# Patient Record
Sex: FEMALE | Race: ASIAN | NOT HISPANIC OR LATINO | Employment: UNEMPLOYED | URBAN - METROPOLITAN AREA
[De-identification: names, ages, dates, MRNs, and addresses within clinical notes are randomized per-mention and may not be internally consistent; named-entity substitution may affect disease eponyms.]

---

## 2020-11-10 ENCOUNTER — TELEPHONE ENCOUNTER (OUTPATIENT)
Dept: URBAN - METROPOLITAN AREA CLINIC 35 | Facility: CLINIC | Age: 75
End: 2020-11-10

## 2020-11-16 ENCOUNTER — OFFICE VISIT (OUTPATIENT)
Dept: URBAN - METROPOLITAN AREA CLINIC 33 | Facility: CLINIC | Age: 75
End: 2020-11-16

## 2020-11-16 VITALS
BODY MASS INDEX: 20.22 KG/M2 | WEIGHT: 103 LBS | SYSTOLIC BLOOD PRESSURE: 130 MMHG | HEIGHT: 60 IN | DIASTOLIC BLOOD PRESSURE: 70 MMHG

## 2020-11-16 RX ORDER — BUMETANIDE 2 MG/1
AS DIRECTED TABLET ORAL
Status: ACTIVE | COMMUNITY

## 2020-11-16 RX ORDER — AMIODARONE HYDROCHLORIDE 200 MG/1
1 TABLET TABLET ORAL ONCE A DAY
Qty: 30 | Status: ACTIVE | COMMUNITY

## 2020-11-16 RX ORDER — CYCLOSPORINE 0.5 MG/ML
1 DROP INTO AFFECTED EYE EMULSION OPHTHALMIC TWICE A DAY
Status: ACTIVE | COMMUNITY

## 2020-11-16 RX ORDER — THIAMINE HCL 100 MG
TABLET ORAL
Status: ACTIVE | COMMUNITY

## 2020-11-16 RX ORDER — DICLOFENAC SODIUM 10 MG/G
AS DIRECTED GEL TOPICAL
Status: ACTIVE | COMMUNITY

## 2020-11-16 RX ORDER — PILOCARPINE HYDROCHLORIDE 5 MG/1
1 TABLET TABLET, FILM COATED ORAL THREE TIMES A DAY
Qty: 90 | Status: ACTIVE | COMMUNITY

## 2020-11-16 RX ORDER — LEVOTHYROXINE SODIUM 25 UG/1
1 TABLET IN THE MORNING ON AN EMPTY STOMACH TABLET ORAL ONCE A DAY
Qty: 30 | Status: ACTIVE | COMMUNITY

## 2020-11-16 RX ORDER — NITROGLYCERIN 0.4 MG/1
AS DIRECTED TABLET SUBLINGUAL
Status: ACTIVE | COMMUNITY

## 2020-11-16 RX ORDER — POTASSIUM CHLORIDE 1500 MG/1
1 TABLET WITH FOOD TABLET, FILM COATED, EXTENDED RELEASE ORAL ONCE A DAY
Qty: 30 | Status: ACTIVE | COMMUNITY

## 2020-11-16 NOTE — HPI-MIGRATED HPI
;   ;     Bloating : 76 y/o female patient admits recent onset of bloating. Patient admits that her bloating issues started to occur after her open heart surgery. Patient admits associated symptoms such abdominal pain, indigestion. Patient admits abdominal tightness, early satiety, and shortness of breath. Patient states that taking Nexium OTC alleviates some symptoms.  Patient admits 1  formed bowel movements per day.  Patient denies mucus, melena, or blood in stools.   Patient is having bloating throughout the day. Patient states sometimes urgency with bowel movements and other times more bloating with bowel movements. It is a gaseous distention. There is no improvement with defecation.    Patient admits having a colonoscopy/EGD in 2016 with normal findings per patient. Patient admits family hx of colonic disease/cancer/polyps. ;   Elevated Liver Enzymes : Patient presents today for consultation about recent elevated liver enzymes.  Patient denies a history of elevated liver enzymes.  Patient admits cold extremeties, upper abdominal pains, since her open heart surgery in (02/2020).  Patient currently denies  jaundice,  dizziness, easy bruising, itchiness, or fatigue.  Recent labs were done on (11/03/2020), which shows: AST H 205 ALT H 194 BUN H 29  RISK FACTORS:  Denies  Alcohol, drugs, travel outside the [place], NSAIDs, protein supplements, tattoos, piercings,  service, blood transfusion, family history of liver disease or cancer, personal exposure to liver diseases, halfway, rehab    ;

## 2020-12-04 LAB
ACTIN (SMOOTH MUSCLE) ANTIBODY: 25
ANA DIRECT: POSITIVE
ANTI-DNA (DS) AB QN: <1
COMMENT:: (no result)
HBSAG SCREEN: NEGATIVE
HCV AB: 0.1
HEPATITIS B SURF AB QUANT: 63.4
LIVER-KIDNEY MICROSOMAL AB: 1.1
Lab: (no result)
MITOCHONDRIAL (M2) ANTIBODY: <20
RNP ANTIBODIES: 0.2
SJOGREN'S ANTI-SS-A: >8
SJOGREN'S ANTI-SS-B: <0.2
SMITH ANTIBODIES: <0.2

## 2021-01-05 ENCOUNTER — WEB ENCOUNTER (OUTPATIENT)
Dept: URBAN - METROPOLITAN AREA CLINIC 35 | Facility: CLINIC | Age: 76
End: 2021-01-05

## 2021-01-11 ENCOUNTER — WEB ENCOUNTER (OUTPATIENT)
Dept: URBAN - METROPOLITAN AREA CLINIC 35 | Facility: CLINIC | Age: 76
End: 2021-01-11

## 2021-01-29 ENCOUNTER — WEB ENCOUNTER (OUTPATIENT)
Dept: URBAN - METROPOLITAN AREA CLINIC 35 | Facility: CLINIC | Age: 76
End: 2021-01-29

## 2021-03-16 ENCOUNTER — TELEPHONE ENCOUNTER (OUTPATIENT)
Dept: URBAN - METROPOLITAN AREA CLINIC 35 | Facility: CLINIC | Age: 76
End: 2021-03-16

## 2021-03-23 ENCOUNTER — TELEPHONE ENCOUNTER (OUTPATIENT)
Dept: URBAN - METROPOLITAN AREA CLINIC 35 | Facility: CLINIC | Age: 76
End: 2021-03-23

## 2021-03-25 ENCOUNTER — OFFICE VISIT (OUTPATIENT)
Dept: URBAN - METROPOLITAN AREA CLINIC 33 | Facility: CLINIC | Age: 76
End: 2021-03-25

## 2021-03-25 ENCOUNTER — LAB OUTSIDE AN ENCOUNTER (OUTPATIENT)
Dept: URBAN - METROPOLITAN AREA SURGERY CENTER 8 | Facility: SURGERY CENTER | Age: 76
End: 2021-03-25

## 2021-03-25 ENCOUNTER — TELEPHONE ENCOUNTER (OUTPATIENT)
Dept: URBAN - METROPOLITAN AREA CLINIC 35 | Facility: CLINIC | Age: 76
End: 2021-03-25

## 2021-03-25 VITALS
HEART RATE: 62 BPM | SYSTOLIC BLOOD PRESSURE: 142 MMHG | BODY MASS INDEX: 21.6 KG/M2 | HEIGHT: 60 IN | DIASTOLIC BLOOD PRESSURE: 59 MMHG | OXYGEN SATURATION: 99 % | WEIGHT: 110 LBS

## 2021-03-25 RX ORDER — LEVOTHYROXINE SODIUM 25 UG/1
1 TABLET IN THE MORNING ON AN EMPTY STOMACH TABLET ORAL ONCE A DAY
Qty: 30 | Status: ACTIVE | COMMUNITY

## 2021-03-25 RX ORDER — AMIODARONE HYDROCHLORIDE 200 MG/1
1 TABLET TABLET ORAL ONCE A DAY
Qty: 30 | Status: DISCONTINUED | COMMUNITY

## 2021-03-25 RX ORDER — DICLOFENAC SODIUM 10 MG/G
AS DIRECTED GEL TOPICAL
Status: ACTIVE | COMMUNITY

## 2021-03-25 RX ORDER — NITROGLYCERIN 0.4 MG/1
AS DIRECTED TABLET SUBLINGUAL
Status: ACTIVE | COMMUNITY

## 2021-03-25 RX ORDER — THIAMINE HCL 100 MG
TABLET ORAL
Status: ACTIVE | COMMUNITY

## 2021-03-25 RX ORDER — CYCLOSPORINE 0.5 MG/ML
1 DROP INTO AFFECTED EYE EMULSION OPHTHALMIC TWICE A DAY
Status: ACTIVE | COMMUNITY

## 2021-03-25 RX ORDER — SPIRONOLACTONE 25 MG/1
1 TABLET TABLET, FILM COATED ORAL ONCE A DAY
Qty: 30 | Refills: 0 | OUTPATIENT
Start: 2021-03-25

## 2021-03-25 RX ORDER — POTASSIUM CHLORIDE 1500 MG/1
1 TABLET WITH FOOD TABLET, FILM COATED, EXTENDED RELEASE ORAL ONCE A DAY
Qty: 30 | Status: ACTIVE | COMMUNITY

## 2021-03-25 RX ORDER — BUMETANIDE 2 MG/1
AS DIRECTED TABLET ORAL
Status: ACTIVE | COMMUNITY

## 2021-03-25 RX ORDER — PILOCARPINE HYDROCHLORIDE 5 MG/1
1 TABLET TABLET, FILM COATED ORAL THREE TIMES A DAY
Qty: 90 | Status: ACTIVE | COMMUNITY

## 2021-03-25 NOTE — HPI-MIGRATED HPI
;   ;   ;   ;     Early Satiety : She admits associated early satiety, onset 02/2020. She reports eating 3 small meals per day with one snack at bedtime.  ;   Bloating : 75 year old female who presents today for a consultation of abdominal distention that "feels full of fluid". She notes onset of symptoms occurred (02/2020) after her  surgery, and reports daily episodes. She notes belching is an alleviating factor. She denies any aggravating factors.  She notes associated shortness of breath.   Of note: Patient reports only sleeping 3 hours per night due to discomfort in abdomen for the past 2 months.   Patient had right carotid endarterectomy with bovine pericardial patch angioplasty with intraoperative shunt placement. (01/29/2020)  Last visit (11/16/2020)  74 y/o female patient admits recent onset of bloating. Patient admits that her bloating issues started to occur after her open heart surgery. Patient admits associated symptoms such abdominal pain, indigestion. Patient admits abdominal tightness, early satiety, and shortness of breath. Patient states that taking Nexium OTC alleviates some symptoms.  Patient admits 1  formed bowel movements per day.  Patient denies mucus, melena, or blood in stools.   Patient is having bloating throughout the day. Patient states sometimes urgency with bowel movements and other times more bloating with bowel movements. It is a gaseous distention. There is no improvement with defecation.   Patient admits having a colonoscopy/EGD in 2016 with normal findings per patient. Patient admits family hx of colonic disease/cancer/polyps.;   Abdominal Pain : Patient reports associated abdominal pain, onset 2 months ago throughout her entire abdomen with symptoms most present at nighttime. She notes abdominal pain is alleviated with Nexium. On a scale of 1-10 she rates pain at 5. When she lays down, the pain will radiate to her left and right side of the abdomen. She notes belching is an alleviating factor. She notes symptoms worsen post prandial. She admits shortness of breath when eating. ;   Elevated Liver Enzymes : Most recent labs completed with PCP on (2/26/2021). Patient currently denies  jaundice,  dizziness, easy bruising, or itchiness.  She admits daily fatigue.    Last visit (11/16/2020) Patient presents today for consultation about recent elevated liver enzymes.  Patient denies a history of elevated liver enzymes.  Patient admits cold extremeties, upper abdominal pains, since her open heart surgery in (02/2020).  Patient currently denies  jaundice,  dizziness, easy bruising, itchiness, or fatigue.  Recent labs were done on (11/03/2020), which shows: AST H 205 ALT H 194 BUN H 29  RISK FACTORS:  Denies  Alcohol, drugs, travel outside the [place], NSAIDs, protein supplements, tattoos, piercings,  service, blood transfusion, family history of liver disease or cancer, personal exposure to liver diseases, halfway, rehab;

## 2021-03-30 ENCOUNTER — TELEPHONE ENCOUNTER (OUTPATIENT)
Dept: URBAN - METROPOLITAN AREA CLINIC 35 | Facility: CLINIC | Age: 76
End: 2021-03-30

## 2021-04-29 ENCOUNTER — TELEPHONE ENCOUNTER (OUTPATIENT)
Dept: URBAN - METROPOLITAN AREA CLINIC 35 | Facility: CLINIC | Age: 76
End: 2021-04-29

## 2021-04-29 ENCOUNTER — OFFICE VISIT (OUTPATIENT)
Dept: URBAN - METROPOLITAN AREA CLINIC 33 | Facility: CLINIC | Age: 76
End: 2021-04-29

## 2021-04-29 VITALS
SYSTOLIC BLOOD PRESSURE: 128 MMHG | HEART RATE: 60 BPM | DIASTOLIC BLOOD PRESSURE: 58 MMHG | HEIGHT: 60 IN | WEIGHT: 111 LBS | OXYGEN SATURATION: 98 % | BODY MASS INDEX: 21.79 KG/M2

## 2021-04-29 PROBLEM — 707724006 ELEVATED LIVER ENZYMES LEVEL: Status: ACTIVE | Noted: 2021-03-25

## 2021-04-29 PROBLEM — 102614006: Status: ACTIVE | Noted: 2021-03-25

## 2021-04-29 PROBLEM — 271737000: Status: ACTIVE | Noted: 2021-04-29

## 2021-04-29 PROBLEM — 60728008 BLOATING: Status: ACTIVE | Noted: 2021-03-25

## 2021-04-29 RX ORDER — PILOCARPINE HYDROCHLORIDE 5 MG/1
1 TABLET TABLET, FILM COATED ORAL THREE TIMES A DAY
Qty: 90 | Status: ACTIVE | COMMUNITY

## 2021-04-29 RX ORDER — LEVOTHYROXINE SODIUM 25 UG/1
1 TABLET IN THE MORNING ON AN EMPTY STOMACH TABLET ORAL ONCE A DAY
Qty: 30 | Status: ACTIVE | COMMUNITY

## 2021-04-29 RX ORDER — POTASSIUM CHLORIDE 1500 MG/1
1 TABLET WITH FOOD TABLET, FILM COATED, EXTENDED RELEASE ORAL ONCE A DAY
Qty: 30 | Status: ACTIVE | COMMUNITY

## 2021-04-29 RX ORDER — THIAMINE HCL 100 MG
TABLET ORAL
Status: ACTIVE | COMMUNITY

## 2021-04-29 RX ORDER — NITROGLYCERIN 0.4 MG/1
AS DIRECTED TABLET SUBLINGUAL
Status: ACTIVE | COMMUNITY

## 2021-04-29 RX ORDER — BUMETANIDE 2 MG/1
AS DIRECTED TABLET ORAL
Status: DISCONTINUED | COMMUNITY

## 2021-04-29 RX ORDER — FUROSEMIDE 20 MG/1
1 TABLET TABLET ORAL ONCE A DAY
Qty: 30 | OUTPATIENT
Start: 2021-04-29

## 2021-04-29 RX ORDER — CYCLOSPORINE 0.5 MG/ML
1 DROP INTO AFFECTED EYE EMULSION OPHTHALMIC TWICE A DAY
Status: ACTIVE | COMMUNITY

## 2021-04-29 RX ORDER — SPIRONOLACTONE 50 MG/1
1 TABLET TABLET, FILM COATED ORAL ONCE A DAY
Qty: 30 | OUTPATIENT
Start: 2021-04-29

## 2021-04-29 RX ORDER — BUMETANIDE 2 MG/1
AS DIRECTED TABLET ORAL
Status: ACTIVE | COMMUNITY

## 2021-04-29 RX ORDER — SPIRONOLACTONE 25 MG/1
1 TABLET TABLET, FILM COATED ORAL ONCE A DAY
Qty: 30 | Refills: 0 | Status: ACTIVE | COMMUNITY
Start: 2021-03-25

## 2021-04-29 RX ORDER — SILDENAFIL CITRATE 25 MG/1
1 TABLET AS NEEDED TABLET, FILM COATED ORAL ONCE A DAY
Qty: 30 | Status: ACTIVE | COMMUNITY

## 2021-04-29 RX ORDER — DICLOFENAC SODIUM 10 MG/G
AS DIRECTED GEL TOPICAL
Status: ACTIVE | COMMUNITY

## 2021-04-29 RX ORDER — POTASSIUM CHLORIDE 1500 MG/1
1 TABLET WITH FOOD TABLET, FILM COATED, EXTENDED RELEASE ORAL ONCE A DAY
OUTPATIENT

## 2021-04-29 NOTE — HPI-MIGRATED HPI
;   ;   ;   ;     Early Satiety : Patient admits/denies any episodes of early satiety since her last office visit.  Last visit (03/25/2021) She admits associated early satiety, onset 02/2020. She reports eating 3 small meals per day with one snack at bedtime.;   Ascites : Patient presents today for follow up of Ascites and to discuss her ultrasound guided paracentesis. She admits/denies any abdominal distension, abdominal pain, shortness of breath, or unintentional weight gain.   She admits/denies starting Spironolactone Tablet, 25 MG 1 tab qd with/or without relief of symptoms.    Last visit (03/25/2021) 75 year old female who presents today for a consultation of abdominal distention that "feels full of fluid". She notes onset of symptoms occurred (02/2020) after her  surgery, and reports daily episodes. She notes belching is an alleviating factor. She denies any aggravating factors.  She notes associated shortness of breath.   Of note: Patient reports only sleeping 3 hours per night due to discomfort in abdomen for the past 2 months.   Patient had right carotid endarterectomy with bovine pericardial patch angioplasty with intraoperative shunt placement. (01/29/2020)  Last visit (11/16/2020)  76 y/o female patient admits recent onset of bloating. Patient admits that her bloating issues started to occur after her open heart surgery. Patient admits associated symptoms such abdominal pain, indigestion. Patient admits abdominal tightness, early satiety, and shortness of breath. Patient states that taking Nexium OTC alleviates some symptoms.  Patient admits 1  formed bowel movements per day.  Patient denies mucus, melena, or blood in stools.   Patient is having bloating throughout the day. Patient states sometimes urgency with bowel movements and other times more bloating with bowel movements. It is a gaseous distention. There is no improvement with defecation.   Patient admits having a colonoscopy/EGD in 2016 with normal findings per patient. Patient admits family hx of colonic disease/cancer/polyps.;   Abdominal Pain : Patient admits/denies any sypmtoms of abdominal pain since her last office visit.  Last visit (03/25/202) Patient reports associated abdominal pain, onset 2 months ago throughout her entire abdomen with symptoms most present at nighttime. She notes abdominal pain is alleviated with Nexium. On a scale of 1-10 she rates pain at 5. When she lays down, the pain will radiate to her left and right side of the abdomen. She notes belching is an alleviating factor. She notes symptoms worsen post prandial. She admits shortness of breath when eating.;   Elevated Liver Enzymes : Most recent labs completed with PCP on (2/26/2021). Patient currently denies  jaundice,  dizziness, easy bruising, or itchiness.  She admits daily fatigue.    Last visit (11/16/2020) Patient presents today for consultation about recent elevated liver enzymes.  Patient denies a history of elevated liver enzymes.  Patient admits cold extremeties, upper abdominal pains, since her open heart surgery in (02/2020).  Patient currently denies  jaundice,  dizziness, easy bruising, itchiness, or fatigue.  Recent labs were done on (11/03/2020), which shows: AST H 205 ALT H 194 BUN H 29  RISK FACTORS:  Denies  Alcohol, drugs, travel outside the [place], NSAIDs, protein supplements, tattoos, piercings,  service, blood transfusion, family history of liver disease or cancer, personal exposure to liver diseases, [facility], rehab;

## 2021-04-29 NOTE — HPI-MIGRATED HPI
;   ;   ;   ;     Early Satiety : Admits continued daily episodes of early satiety since her last office visit. She reports eating 2 meals per day with snacks in between meals.    Last visit (03/25/2021) She admits associated early satiety, onset 02/2020. She reports eating 3 small meals per day with one snack at bedtime.;   Bloating : 75 year old female who presents today with her daughter Jany for a follow up of abdominal destention.  Daughter Jany states patient saw Cardiologist Dr. Flanagan yesterday and he suggest she keep appointment with GI for possbile repeat paracentesis.    Of note, daughter state she didn't realize labs were ordered and the MRI was r/s due to patient having a pace maker and could not have MRI at location she was initially schedued at.  MRI is set for May 11, 2021  She notes continued bloating in epigastric region, and shortness of breath. She states her left and right side of abdomen improved, however her epigastric region remained very hard and bloated.   Dr. Flanagan increased the dosage or Spironolactone to 50 mg QD and started her on Viagra 25 mg 1 QD.    Last visit (3/25/2021)  75 year old female who presents today for a consultation of abdominal distention that "feels full of fluid". She notes onset of symptoms occurred (02/2020) after her  surgery, and reports daily episodes. She notes belching is an alleviating factor. She denies any aggravating factors.  She notes associated shortness of breath.   Of note: Patient reports only sleeping 3 hours per night due to discomfort in abdomen for the past 2 months.   Patient had right carotid endarterectomy with bovine pericardial patch angioplasty with intraoperative shunt placement. (01/29/2020)  Last visit (11/16/2020)  74 y/o female patient admits recent onset of bloating. Patient admits that her bloating issues started to occur after her open heart surgery. Patient admits associated symptoms such abdominal pain, indigestion. Patient admits abdominal tightness, early satiety, and shortness of breath. Patient states that taking Nexium OTC alleviates some symptoms.  Patient admits 1  formed bowel movements per day.  Patient denies mucus, melena, or blood in stools.   Patient is having bloating throughout the day. Patient states sometimes urgency with bowel movements and other times more bloating with bowel movements. It is a gaseous distention. There is no improvement with defecation.   Patient admits having a colonoscopy/EGD in 2016 with normal findings per patient. Patient admits family hx of colonic disease/cancer/polyps.;   Abdominal Pain : Admits continued daily abdominal pain in the epigastric region. She denies any improvement since her last office visit.   Last visit (03/25/2021) Patient reports associated abdominal pain, onset 2 months ago throughout her entire abdomen with symptoms most present at nighttime. She notes abdominal pain is alleviated with Nexium. On a scale of 1-10 she rates pain at 5. When she lays down, the pain will radiate to her left and right side of the abdomen. She notes belching is an alleviating factor. She notes symptoms worsen post prandial. She admits shortness of breath when eating.;   Elevated Liver Enzymes : Denies jaundice,  dizziness, easy bruising. She admits daily fatigue, itchiness.   Last visit (03/25/2021) Most recent labs completed with PCP on (2/26/2021). Patient currently denies  jaundice,  dizziness, easy bruising, or itchiness.  She admits daily fatigue.    Last visit (11/16/2020)  Patient presents today for consultation about recent elevated liver enzymes.  Patient denies a history of elevated liver enzymes.  Patient admits cold extremeties, upper abdominal pains, since her open heart surgery in (02/2020).  Patient currently denies  jaundice,  dizziness, easy bruising, itchiness, or fatigue.  Recent labs were done on (11/03/2020), which shows: AST H 205 ALT H 194 BUN H 29  RISK FACTORS:  Denies  Alcohol, drugs, travel outside the [place], NSAIDs, protein supplements, tattoos, piercings,  service, blood transfusion, family history of liver disease or cancer, personal exposure to liver diseases, [facility]DC, rehab;

## 2021-04-29 NOTE — EXAM-MIGRATED EXAMINATIONS
ABDOMEN: - bowel sounds present, no masses palpable, no organomegaly , no rebound tenderness, soft, nontender, distended. Free fluid present ;   EXTREMITIES: - Right knee - medial aspect swelling ;

## 2021-05-13 ENCOUNTER — TELEPHONE ENCOUNTER (OUTPATIENT)
Dept: URBAN - METROPOLITAN AREA CLINIC 35 | Facility: CLINIC | Age: 76
End: 2021-05-13

## 2021-05-13 RX ORDER — SPIRONOLACTONE 25 MG/1
1 TABLET TABLET, FILM COATED ORAL ONCE A DAY
Qty: 30 | Refills: 0 | OUTPATIENT
Start: 2021-03-25

## 2021-05-26 ENCOUNTER — TELEPHONE ENCOUNTER (OUTPATIENT)
Dept: URBAN - METROPOLITAN AREA CLINIC 35 | Facility: CLINIC | Age: 76
End: 2021-05-26

## 2021-06-10 ENCOUNTER — OFFICE VISIT (OUTPATIENT)
Dept: URBAN - METROPOLITAN AREA CLINIC 33 | Facility: CLINIC | Age: 76
End: 2021-06-10

## 2021-06-10 VITALS
WEIGHT: 90 LBS | DIASTOLIC BLOOD PRESSURE: 50 MMHG | OXYGEN SATURATION: 96 % | SYSTOLIC BLOOD PRESSURE: 120 MMHG | HEIGHT: 60 IN | HEART RATE: 60 BPM | BODY MASS INDEX: 17.67 KG/M2

## 2021-06-10 RX ORDER — SPIRONOLACTONE 50 MG/1
1 TABLET TABLET, FILM COATED ORAL ONCE A DAY
Qty: 30 | Status: ACTIVE | COMMUNITY
Start: 2021-04-29

## 2021-06-10 RX ORDER — BUMETANIDE 2 MG/1
AS DIRECTED TABLET ORAL
Status: ACTIVE | COMMUNITY

## 2021-06-10 RX ORDER — THIAMINE HCL 100 MG
TABLET ORAL
Status: ON HOLD | COMMUNITY

## 2021-06-10 RX ORDER — SPIRONOLACTONE 25 MG/1
1 TABLET TABLET, FILM COATED ORAL ONCE A DAY
OUTPATIENT
Start: 2021-03-25

## 2021-06-10 RX ORDER — CYCLOSPORINE 0.5 MG/ML
1 DROP INTO AFFECTED EYE EMULSION OPHTHALMIC TWICE A DAY
Status: ACTIVE | COMMUNITY

## 2021-06-10 RX ORDER — PILOCARPINE HYDROCHLORIDE 5 MG/1
1 TABLET TABLET, FILM COATED ORAL THREE TIMES A DAY
Qty: 90 | Status: ON HOLD | COMMUNITY

## 2021-06-10 RX ORDER — POTASSIUM CHLORIDE 1500 MG/1
1 TABLET WITH FOOD TABLET, FILM COATED, EXTENDED RELEASE ORAL ONCE A DAY
Qty: 30 | Status: ON HOLD | COMMUNITY

## 2021-06-10 RX ORDER — SILDENAFIL CITRATE 25 MG/1
1 TABLET AS NEEDED TABLET, FILM COATED ORAL ONCE A DAY
Qty: 30 | Status: ACTIVE | COMMUNITY

## 2021-06-10 RX ORDER — DICLOFENAC SODIUM 10 MG/G
AS DIRECTED GEL TOPICAL
Status: ON HOLD | COMMUNITY

## 2021-06-10 RX ORDER — LEVOTHYROXINE SODIUM 25 UG/1
1 TABLET IN THE MORNING ON AN EMPTY STOMACH TABLET ORAL ONCE A DAY
Qty: 30 | Status: ACTIVE | COMMUNITY

## 2021-06-10 RX ORDER — NITROGLYCERIN 0.4 MG/1
AS DIRECTED TABLET SUBLINGUAL
Status: ACTIVE | COMMUNITY

## 2021-06-10 RX ORDER — FUROSEMIDE 20 MG/1
1 TABLET TABLET ORAL ONCE A DAY
Qty: 30 | Status: ON HOLD | COMMUNITY
Start: 2021-04-29

## 2021-06-10 RX ORDER — SPIRONOLACTONE 25 MG/1
1 TABLET TABLET, FILM COATED ORAL ONCE A DAY
Qty: 30 | Refills: 0 | Status: ACTIVE | COMMUNITY
Start: 2021-03-25

## 2021-06-10 RX ORDER — BUMETANIDE 1 MG/1
1 TABLET TABLET ORAL DAILY
Qty: 90 TABLET | Refills: 1 | OUTPATIENT

## 2021-06-10 NOTE — HPI-MIGRATED HPI
;   ;   ;   ;   ;   ;     Early Satiety : Patient admits to episodes of early satiety since her last office visit.  Last visit (03/25/2021) She admits associated early satiety, onset 02/2020. She reports eating 3 small meals per day with one snack at bedtime.;   Bloating : Patient denies continued bloating.     Last visit (4/29/2021)  75 year old female who presents today with her daughter Jany for a follow up of abdominal destention. Daughter Jany states patient saw Cardiologist Dr. Flanagan yesterday and he suggest she keep appointment with GI for possbile repeat paracentesis.          Of note, daughter state she didn't realize labs were ordered and the MRI was r/s due to patient having a pace maker and could not have MRI at location she was initially schedued at. MRI is set for May 11, 2021         She notes continued bloating in epigastric region, and shortness of breath. She states her left and right side of abdomen improved, however her epigastric region remained very hard and bloated.          Dr. Flanagan increased the dosage or Spironolactone to 50 mg QD and started her on Viagra 25 mg 1 QD.    Last visit (03/25/2021) 75 year old female who presents today for a consultation of abdominal distention that "feels full of fluid". She notes onset of symptoms occurred (02/2020) after her  surgery, and reports daily episodes. She notes belching is an alleviating factor. She denies any aggravating factors.  She notes associated shortness of breath.   Of note: Patient reports only sleeping 3 hours per night due to discomfort in abdomen for the past 2 months.   Patient had right carotid endarterectomy with bovine pericardial patch angioplasty with intraoperative shunt placement. (01/29/2020)  Last visit (11/16/2020)  76 y/o female patient admits recent onset of bloating. Patient admits that her bloating issues started to occur after her open heart surgery. Patient admits associated symptoms such abdominal pain, indigestion. Patient admits abdominal tightness, early satiety, and shortness of breath. Patient states that taking Nexium OTC alleviates some symptoms.  Patient admits 1  formed bowel movements per day.  Patient denies mucus, melena, or blood in stools.   Patient is having bloating throughout the day. Patient states sometimes urgency with bowel movements and other times more bloating with bowel movements. It is a gaseous distention. There is no improvement with defecation.   Patient admits having a colonoscopy/EGD in 2016 with normal findings per patient. Patient admits family hx of colonic disease/cancer/polyps.;   Ascites : 75 year- old female patient presents today for follow up of Ascites and to discuss her ultrasound guided paracentesis and lab results . She denies any abdominal distension, abdominal pain, she admits to shortness of breath, or unintentional weight gain.  She admits starting Spironolactone Tablet, 25 MG, Bumetanide 1 MG tablet. with relief of symptoms.  Patient is no longer taking Furosemide Tablet, 20 MG   Admits to the discontinuation of the Potassium Chloride ER Tablet Extended Release, 20 MEQ.  Labs drawn (06/01/2021) CBC Immunoglobulin A, Qn, Serum  736 High RBC 2.92 Low Hemoglobin  9.6 Low Hematocrit  28.7 Low MCV  98 High  CMP Glucose 117 High  BUN  100 Alert  Creatinine  1.97 High eGFR If NonAfricn Am 24 Low eGFR If Africn Am 28 Low  BUN/Creatinine Ratio 51 High  Sodium 132 Low Potassium 5.3 High Chloride 93 Low Protein, Total  9.9 High Globulin, Total 6.1 High A/G Ratio 0.6 Low AST  96 High  ALT  58 High  Iron and TIBC UIBC  374 High Iron Saturation 12 Low  Immunoglobulin G, Qn, Serum 01 4457 High  Vitamin B12  1863 High  Reticulocyte Count 2.9 High  MRI ABD (05/11/2021) 1. Limited study due to respiratory motion artifact. 2.  Hepatic cirrhosis. No define suspicious hepatic lesions within limitations of the study. Recommend continued surveillance. 3 . Moderate abdominal ascites. 4. Cholelithiasis. 5. Large right and moderate left pleural effusion. 6. Mild to moderate anasarca.   US paracenthesis (05/03/2021) 2.4 L of magui fluid drained without complications.    ;   Abdominal Pain : Patient denies any sypmtoms of abdominal pain since her last office visit.   Last visit (03/25/202) Patient reports associated abdominal pain, onset 2 months ago throughout her entire abdomen with symptoms most present at nighttime. She notes abdominal pain is alleviated with Nexium. On a scale of 1-10 she rates pain at 5. When she lays down, the pain will radiate to her left and right side of the abdomen. She notes belching is an alleviating factor. She notes symptoms worsen post prandial. She admits shortness of breath when eating.;   Constipation : Patient admits longstanding history of constipation.  Onset was 03/2020.  Patient currently admits 1 bowel movement per day.  Stools are small and hard.  Patient denies blood, mucus, or melena in stools.  Patient states that their previous bowel habits were 1 movement per a day, with soft and normal stools.  Patient denies aggravating factors and has tried Milk of magnesium nad pune juice with some relief of symptoms. Patient admits that she is no longer taking the Milk magnesium and Prune juice. Patient denies associated abdominal pains, bloating, and gas.  When a patient has a bowel movement she does not feel like she is completely emptying their bowels.  Patient denies fecal incontinence.  Patient denies recently drinking lake or well water. She admits to some recent changes in her medications with Synthroid 25 MCG. She denies taking any antibiotics in the last 6 months.  Patient admits to having a colonoscopy.  It was performed at [facility] in [place] in 2015 and it was NL.  ;   Elevated Liver Enzymes : Patient currently denies  jaundice,  dizziness, easy bruising.  She admits to some daily fatigue and itchiness    Last visit (4/29/2021) Most recent labs completed with PCP on (2/26/2021). Patient currently denies  jaundice,  dizziness, easy bruising, or itchiness.  She admits daily fatigue.    Last visit (11/16/2020) Patient presents today for consultation about recent elevated liver enzymes.  Patient denies a history of elevated liver enzymes.  Patient admits cold extremeties, upper abdominal pains, since her open heart surgery in (02/2020).  Patient currently denies  jaundice,  dizziness, easy bruising, itchiness, or fatigue.  Recent labs were done on (11/03/2020), which shows: AST H 205 ALT H 194 BUN H 29  RISK FACTORS:  Denies  Alcohol, drugs, travel outside the [place], NSAIDs, protein supplements, tattoos, piercings,  service, blood transfusion, family history of liver disease or cancer, personal exposure to liver diseases, correction, rehab;

## 2021-06-17 ENCOUNTER — TELEPHONE ENCOUNTER (OUTPATIENT)
Dept: URBAN - METROPOLITAN AREA CLINIC 35 | Facility: CLINIC | Age: 76
End: 2021-06-17

## 2021-06-17 ENCOUNTER — LAB OUTSIDE AN ENCOUNTER (OUTPATIENT)
Dept: URBAN - METROPOLITAN AREA SURGERY CENTER 8 | Facility: SURGERY CENTER | Age: 76
End: 2021-06-17

## 2021-06-21 ENCOUNTER — TELEPHONE ENCOUNTER (OUTPATIENT)
Dept: URBAN - METROPOLITAN AREA CLINIC 35 | Facility: CLINIC | Age: 76
End: 2021-06-21

## 2021-06-23 ENCOUNTER — TELEPHONE ENCOUNTER (OUTPATIENT)
Dept: URBAN - METROPOLITAN AREA CLINIC 35 | Facility: CLINIC | Age: 76
End: 2021-06-23

## 2021-07-01 ENCOUNTER — TELEPHONE ENCOUNTER (OUTPATIENT)
Dept: URBAN - METROPOLITAN AREA CLINIC 35 | Facility: CLINIC | Age: 76
End: 2021-07-01

## 2021-07-08 ENCOUNTER — OFFICE VISIT (OUTPATIENT)
Dept: URBAN - METROPOLITAN AREA CLINIC 33 | Facility: CLINIC | Age: 76
End: 2021-07-08

## 2021-07-09 ENCOUNTER — TELEPHONE ENCOUNTER (OUTPATIENT)
Dept: URBAN - METROPOLITAN AREA CLINIC 35 | Facility: CLINIC | Age: 76
End: 2021-07-09

## 2021-07-14 ENCOUNTER — TELEPHONE ENCOUNTER (OUTPATIENT)
Dept: URBAN - METROPOLITAN AREA CLINIC 35 | Facility: CLINIC | Age: 76
End: 2021-07-14

## 2021-07-16 ENCOUNTER — TELEPHONE ENCOUNTER (OUTPATIENT)
Dept: URBAN - METROPOLITAN AREA CLINIC 35 | Facility: CLINIC | Age: 76
End: 2021-07-16

## 2021-07-22 ENCOUNTER — OFFICE VISIT (OUTPATIENT)
Dept: URBAN - METROPOLITAN AREA CLINIC 35 | Facility: CLINIC | Age: 76
End: 2021-07-22

## 2021-07-22 RX ORDER — FUROSEMIDE 20 MG/1
1 TABLET TABLET ORAL ONCE A DAY
Qty: 30 | Status: ON HOLD | COMMUNITY
Start: 2021-04-29

## 2021-07-22 RX ORDER — THIAMINE HCL 100 MG
TABLET ORAL
Status: ON HOLD | COMMUNITY

## 2021-07-22 RX ORDER — DICLOFENAC SODIUM 10 MG/G
AS DIRECTED GEL TOPICAL
Status: ON HOLD | COMMUNITY

## 2021-07-22 RX ORDER — LEVOTHYROXINE SODIUM 25 UG/1
1 TABLET IN THE MORNING ON AN EMPTY STOMACH TABLET ORAL ONCE A DAY
Qty: 30 | Status: ACTIVE | COMMUNITY

## 2021-07-22 RX ORDER — SPIRONOLACTONE 25 MG/1
1 TABLET TABLET, FILM COATED ORAL ONCE A DAY
Status: ACTIVE | COMMUNITY
Start: 2021-03-25

## 2021-07-22 RX ORDER — CYCLOSPORINE 0.5 MG/ML
1 DROP INTO AFFECTED EYE EMULSION OPHTHALMIC TWICE A DAY
Status: ACTIVE | COMMUNITY

## 2021-07-22 RX ORDER — SPIRONOLACTONE 50 MG/1
1 TABLET TABLET, FILM COATED ORAL ONCE A DAY
Qty: 30 | Status: ACTIVE | COMMUNITY
Start: 2021-04-29

## 2021-07-22 RX ORDER — BUMETANIDE 1 MG/1
1 TABLET TABLET ORAL DAILY
Qty: 90 TABLET | Refills: 1 | Status: ACTIVE | COMMUNITY

## 2021-07-22 RX ORDER — NITROGLYCERIN 0.4 MG/1
AS DIRECTED TABLET SUBLINGUAL
Status: ACTIVE | COMMUNITY

## 2021-07-22 RX ORDER — PILOCARPINE HYDROCHLORIDE 5 MG/1
1 TABLET TABLET, FILM COATED ORAL THREE TIMES A DAY
Qty: 90 | Status: ON HOLD | COMMUNITY

## 2021-07-22 RX ORDER — POTASSIUM CHLORIDE 1500 MG/1
1 TABLET WITH FOOD TABLET, FILM COATED, EXTENDED RELEASE ORAL ONCE A DAY
Qty: 30 | Status: ON HOLD | COMMUNITY

## 2021-07-22 RX ORDER — SILDENAFIL CITRATE 25 MG/1
1 TABLET AS NEEDED TABLET, FILM COATED ORAL ONCE A DAY
Qty: 30 | Status: ACTIVE | COMMUNITY

## 2021-07-22 NOTE — HPI-MIGRATED HPI
;   ;   ;   ;   ;   ;     Early Satiety : She admits/denies a Nutritional consult since her last visit.   Patient admits/denies continued early satiety.   Last visit (6/10/2021)  Patient admits to episodes of early satiety since her last office visit.  Last visit (03/25/2021) She admits associated early satiety, onset 02/2020. She reports eating 3 small meals per day with one snack at bedtime.;   Bloating : Patient admits/denies improvement in episodes of bloating.    Last visit (6/10/2021)  Patient denies continued bloating.     Last visit (4/29/2021)  75 year old female who presents today with her daughter Jany for a follow up of abdominal destention. Daughter Jany states patient saw Cardiologist Dr. Flanagan yesterday and he suggest she keep appointment with GI for possbile repeat paracentesis.          Of note, daughter state she didn't realize labs were ordered and the MRI was r/s due to patient having a pace maker and could not have MRI at location she was initially schedued at. MRI is set for May 11, 2021         She notes continued bloating in epigastric region, and shortness of breath. She states her left and right side of abdomen improved, however her epigastric region remained very hard and bloated.          Dr. Flanagan increased the dosage or Spironolactone to 50 mg QD and started her on Viagra 25 mg 1 QD.    Last visit (03/25/2021) 75 year old female who presents today for a consultation of abdominal distention that "feels full of fluid". She notes onset of symptoms occurred (02/2020) after her  surgery, and reports daily episodes. She notes belching is an alleviating factor. She denies any aggravating factors.  She notes associated shortness of breath.   Of note: Patient reports only sleeping 3 hours per night due to discomfort in abdomen for the past 2 months.   Patient had right carotid endarterectomy with bovine pericardial patch angioplasty with intraoperative shunt placement. (01/29/2020)  Last visit (11/16/2020)  76 y/o female patient admits recent onset of bloating. Patient admits that her bloating issues started to occur after her open heart surgery. Patient admits associated symptoms such abdominal pain, indigestion. Patient admits abdominal tightness, early satiety, and shortness of breath. Patient states that taking Nexium OTC alleviates some symptoms.  Patient admits 1  formed bowel movements per day.  Patient denies mucus, melena, or blood in stools.   Patient is having bloating throughout the day. Patient states sometimes urgency with bowel movements and other times more bloating with bowel movements. It is a gaseous distention. There is no improvement with defecation.   Patient admits having a colonoscopy/EGD in 2016 with normal findings per patient. Patient admits family hx of colonic disease/cancer/polyps.;   Ascites : Patient presents today via tele visit with consent for a follow up of ascites and to review imaging that was completed. She admits the continued use of Bumetanide Tablet, 1 MG --- (how often) as well as Spironolactone Tablet, 25 MG 1 PO QD.   She admits/denies an appt with Dr Washington Estrada since her last visit. Patient reports ----.   Patient had an Abd U/S completed on 6/22/2021 revealing hetergeneous hepatic parenchymal echo pattern. Cholelithiasis with thick-walled gallbladder. No ascites.   She also had a CT Liver Biopsy completed on 7/19/2021 no path has come back at this time.    Last visit (6/10/2021)  75 year- old female patient presents today for follow up of Ascites and to discuss her ultrasound guided paracentesis and lab results . She denies any abdominal distension, abdominal pain, she admits to shortness of breath, or unintentional weight gain.  She admits starting Spironolactone Tablet, 25 MG, Bumetanide 1 MG tablet. with relief of symptoms.  Patient is no longer taking Furosemide Tablet, 20 MG   Admits to the discontinuation of the Potassium Chloride ER Tablet Extended Release, 20 MEQ.  Labs drawn (06/01/2021) CBC Immunoglobulin A, Qn, Serum  736 High RBC 2.92 Low Hemoglobin  9.6 Low Hematocrit  28.7 Low MCV  98 High  CMP Glucose 117 High  BUN  100 Alert  Creatinine  1.97 High eGFR If NonAfricn Am 24 Low eGFR If Africn Am 28 Low  BUN/Creatinine Ratio 51 High  Sodium 132 Low Potassium 5.3 High Chloride 93 Low Protein, Total  9.9 High Globulin, Total 6.1 High A/G Ratio 0.6 Low AST  96 High  ALT  58 High  Iron and TIBC UIBC  374 High Iron Saturation 12 Low  Immunoglobulin G, Qn, Serum 01 4457 High  Vitamin B12  1863 High  Reticulocyte Count 2.9 High  MRI ABD (05/11/2021) 1. Limited study due to respiratory motion artifact. 2.  Hepatic cirrhosis. No define suspicious hepatic lesions within limitations of the study. Recommend continued surveillance. 3 . Moderate abdominal ascites. 4. Cholelithiasis. 5. Large right and moderate left pleural effusion. 6. Mild to moderate anasarca.   US paracenthesis (05/03/2021) 2.4 L of magui fluid drained without complications.;   Abdominal Pain : She admits/denies continued abdominal pain since her last visit.      Last visit (6/10/2021)  Patient denies any sypmtoms of abdominal pain since her last office visit.   Last visit (03/25/202) Patient reports associated abdominal pain, onset 2 months ago throughout her entire abdomen with symptoms most present at nighttime. She notes abdominal pain is alleviated with Nexium. On a scale of 1-10 she rates pain at 5. When she lays down, the pain will radiate to her left and right side of the abdomen. She notes belching is an alleviating factor. She notes symptoms worsen post prandial. She admits shortness of breath when eating.;   Constipation : She admits/denies improvement in episodes of constipation since her last visit.   Currently she reports --- bowel movements ---. Her stools are --- with/out blood,  mucus, or melena. Patient admits/denies pruritus ani or rectal pain.    Last visit (6/10/2021)  Patient admits longstanding history of constipation.  Onset was 03/2020.  Patient currently admits 1 bowel movement per day.  Stools are small and hard.  Patient denies blood, mucus, or melena in stools.  Patient states that their previous bowel habits were 1 movement per a day, with soft and normal stools.  Patient denies aggravating factors and has tried Milk of magnesium nad pune juice with some relief of symptoms. Patient admits that she is no longer taking the Milk magnesium and Prune juice. Patient denies associated abdominal pains, bloating, and gas.  When a patient has a bowel movement she does not feel like she is completely emptying their bowels.  Patient denies fecal incontinence.  Patient denies recently drinking lake or well water. She admits to some recent changes in her medications with Synthroid 25 MCG. She denies taking any antibiotics in the last 6 months.  Patient admits to having a colonoscopy.  It was performed at [facility] in [place] in 2015 and it was NL.;   Elevated Liver Enzymes : Her last labs were completed on ---- revealing ----.    Last visit (6/10/2021)  Patient currently denies  jaundice,  dizziness, easy bruising.  She admits to some daily fatigue and itchiness    Last visit (4/29/2021) Most recent labs completed with PCP on (2/26/2021). Patient currently denies  jaundice,  dizziness, easy bruising, or itchiness.  She admits daily fatigue.    Last visit (11/16/2020) Patient presents today for consultation about recent elevated liver enzymes.  Patient denies a history of elevated liver enzymes.  Patient admits cold extremeties, upper abdominal pains, since her open heart surgery in (02/2020).  Patient currently denies  jaundice,  dizziness, easy bruising, itchiness, or fatigue.  Recent labs were done on (11/03/2020), which shows: AST H 205 ALT H 194 BUN H 29  RISK FACTORS:  Denies  Alcohol, drugs, travel outside the [place], NSAIDs, protein supplements, tattoos, piercings,  service, blood transfusion, family history of liver disease or cancer, personal exposure to liver diseases, half-way, rehab;

## 2021-07-25 ENCOUNTER — WEB ENCOUNTER (OUTPATIENT)
Dept: URBAN - METROPOLITAN AREA CLINIC 35 | Facility: CLINIC | Age: 76
End: 2021-07-25

## 2021-07-25 RX ORDER — SPIRONOLACTONE 25 MG/1
1 TABLET TABLET, FILM COATED ORAL ONCE A DAY
Qty: 30 TABLET | Refills: 0 | OUTPATIENT
Start: 2021-03-25

## 2021-07-27 ENCOUNTER — OFFICE VISIT (OUTPATIENT)
Dept: URBAN - METROPOLITAN AREA CLINIC 31 | Facility: CLINIC | Age: 76
End: 2021-07-27

## 2021-07-27 VITALS
WEIGHT: 91.4 LBS | DIASTOLIC BLOOD PRESSURE: 46 MMHG | HEIGHT: 60 IN | HEART RATE: 74 BPM | OXYGEN SATURATION: 97 % | SYSTOLIC BLOOD PRESSURE: 121 MMHG | BODY MASS INDEX: 17.94 KG/M2

## 2021-07-27 RX ORDER — POTASSIUM CHLORIDE 1500 MG/1
1 TABLET WITH FOOD TABLET, FILM COATED, EXTENDED RELEASE ORAL ONCE A DAY
Qty: 30 | Status: ON HOLD | COMMUNITY

## 2021-07-27 RX ORDER — LEVOTHYROXINE SODIUM 25 UG/1
1 CAPSULE IN THE MORNING ON AN EMPTY STOMACH CAPSULE ORAL ONCE A DAY
Qty: 30 | Status: ACTIVE | COMMUNITY
Start: 2021-07-27

## 2021-07-27 RX ORDER — BUMETANIDE 1 MG/1
1 TABLET TABLET ORAL DAILY
OUTPATIENT

## 2021-07-27 RX ORDER — BUMETANIDE 1 MG/1
1 TABLET TABLET ORAL DAILY
Qty: 90 TABLET | Refills: 1 | Status: ACTIVE | COMMUNITY

## 2021-07-27 RX ORDER — PILOCARPINE HYDROCHLORIDE 5 MG/1
1 TABLET TABLET, FILM COATED ORAL THREE TIMES A DAY
Qty: 90 | Status: ON HOLD | COMMUNITY

## 2021-07-27 RX ORDER — SILDENAFIL CITRATE 25 MG/1
1 TABLET AS NEEDED TABLET, FILM COATED ORAL ONCE A DAY
Qty: 30 | Status: ACTIVE | COMMUNITY

## 2021-07-27 RX ORDER — SPIRONOLACTONE 25 MG/1
1 TABLET TABLET, FILM COATED ORAL ONCE A DAY
Qty: 30 TABLET | Refills: 0 | Status: ON HOLD | COMMUNITY
Start: 2021-03-25

## 2021-07-27 RX ORDER — THIAMINE HCL 100 MG
TABLET ORAL
Status: ON HOLD | COMMUNITY

## 2021-07-27 RX ORDER — NITROGLYCERIN 0.4 MG/1
AS DIRECTED TABLET SUBLINGUAL
Status: ACTIVE | COMMUNITY

## 2021-07-27 RX ORDER — SPIRONOLACTONE 25 MG/1
1 TABLET TABLET, FILM COATED ORAL ONCE A DAY
Status: ACTIVE | COMMUNITY
Start: 2021-04-29

## 2021-07-27 RX ORDER — LEVOTHYROXINE SODIUM 25 UG/1
1 TABLET IN THE MORNING ON AN EMPTY STOMACH TABLET ORAL ONCE A DAY
Qty: 30 | Status: ACTIVE | COMMUNITY

## 2021-07-27 RX ORDER — FUROSEMIDE 20 MG/1
1 TABLET TABLET ORAL ONCE A DAY
Qty: 30 | Status: ON HOLD | COMMUNITY
Start: 2021-04-29

## 2021-07-27 RX ORDER — LEVOTHYROXINE SODIUM 25 UG/1
1 TABLET IN THE MORNING ON AN EMPTY STOMACH TABLET ORAL ONCE A DAY
OUTPATIENT

## 2021-07-27 RX ORDER — DICLOFENAC SODIUM 10 MG/G
AS DIRECTED GEL TOPICAL
Status: ON HOLD | COMMUNITY

## 2021-07-27 RX ORDER — CYCLOSPORINE 0.5 MG/ML
1 DROP INTO AFFECTED EYE EMULSION OPHTHALMIC TWICE A DAY
Status: ACTIVE | COMMUNITY

## 2021-07-27 NOTE — HPI-MIGRATED HPI
;   ;   ;   ;   ;   ;     Early Satiety : She denies having a Nutritional consult since her last visit. Patient admits mild improvement in appetite and early satiety.   Last visit (6/10/2021)  Patient admits to episodes of early satiety since her last office visit.  Last visit (03/25/2021) She admits associated early satiety, onset 02/2020. She reports eating 3 small meals per day with one snack at bedtime.;   Bloating : Patient admits improvement in episodes of bloating.    Last visit (6/10/2021)  Patient denies continued bloating.    Last visit (4/29/2021)  75 year old female who presents today with her daughter Jany for a follow up of abdominal destention. Daughter Jany states patient saw Cardiologist Dr. Flanagan yesterday and he suggest she keep appointment with GI for possbile repeat paracentesis.          Of note, daughter state she didn't realize labs were ordered and the MRI was r/s due to patient having a pace maker and could not have MRI at location she was initially schedued at. MRI is set for May 11, 2021         She notes continued bloating in epigastric region, and shortness of breath. She states her left and right side of abdomen improved, however her epigastric region remained very hard and bloated.          Dr. Flanagan increased the dosage or Spironolactone to 50 mg QD and started her on Viagra 25 mg 1 QD.    Last visit (03/25/2021) 75 year old female who presents today for a consultation of abdominal distention that "feels full of fluid". She notes onset of symptoms occurred (02/2020) after her  surgery, and reports daily episodes. She notes belching is an alleviating factor. She denies any aggravating factors.  She notes associated shortness of breath.   Of note: Patient reports only sleeping 3 hours per night due to discomfort in abdomen for the past 2 months.   Patient had right carotid endarterectomy with bovine pericardial patch angioplasty with intraoperative shunt placement. (01/29/2020)  Last visit (11/16/2020)  74 y/o female patient admits recent onset of bloating. Patient admits that her bloating issues started to occur after her open heart surgery. Patient admits associated symptoms such abdominal pain, indigestion. Patient admits abdominal tightness, early satiety, and shortness of breath. Patient states that taking Nexium OTC alleviates some symptoms.  Patient admits 1  formed bowel movements per day.  Patient denies mucus, melena, or blood in stools.   Patient is having bloating throughout the day. Patient states sometimes urgency with bowel movements and other times more bloating with bowel movements. It is a gaseous distention. There is no improvement with defecation.   Patient admits having a colonoscopy/EGD in 2016 with normal findings per patient. Patient admits family hx of colonic disease/cancer/polyps.;   Ascites : Patient presents today with her daughter via a telephonic visit with consent to review Liver biopsy and follow up of ascites.   She denies the continued use of Bumetanide Tablet, 1 MG. Dr Washington Estrada  advised patient to dc Bumetanide if she weighs less than 90 lbs. If she weighs more than 90 lbs he she should add 1 mg of Bumentanide. Patient now weighs 91.4.   She admits the use of Spironolactone Tablet, 25 MG 1 PO QD and states that it is helping.   She admits an appt with Dr Washington Estrada since her last visit. Patient reports that she has a f/u appt in August.   Patient had an Abd U/S completed on 6/22/2021 revealing hetergeneous hepatic parenchymal echo pattern. Cholelithiasis with thick-walled gallbladder. No ascites.   She also had a CT Liver Biopsy completed on 7/19/2021 no path has come back at this time.    Last visit (6/10/2021)  75 year- old female patient presents today for follow up of Ascites and to discuss her ultrasound guided paracentesis and lab results . She denies any abdominal distension, abdominal pain, she admits to shortness of breath, or unintentional weight gain.  She admits starting Spironolactone Tablet, 25 MG, Bumetanide 1 MG tablet. with relief of symptoms.  Patient is no longer taking Furosemide Tablet, 20 MG   Admits to the discontinuation of the Potassium Chloride ER Tablet Extended Release, 20 MEQ.  Labs drawn (06/01/2021) CBC Immunoglobulin A, Qn, Serum  736 High RBC 2.92 Low Hemoglobin  9.6 Low Hematocrit  28.7 Low MCV  98 High  CMP Glucose 117 High  BUN  100 Alert  Creatinine  1.97 High eGFR If NonAfricn Am 24 Low eGFR If Africn Am 28 Low  BUN/Creatinine Ratio 51 High  Sodium 132 Low Potassium 5.3 High Chloride 93 Low Protein, Total  9.9 High Globulin, Total 6.1 High A/G Ratio 0.6 Low AST  96 High  ALT  58 High  Iron and TIBC UIBC  374 High Iron Saturation 12 Low  Immunoglobulin G, Qn, Serum 01 4457 High  Vitamin B12  1863 High  Reticulocyte Count 2.9 High  MRI ABD (05/11/2021) 1. Limited study due to respiratory motion artifact. 2.  Hepatic cirrhosis. No define suspicious hepatic lesions within limitations of the study. Recommend continued surveillance. 3 . Moderate abdominal ascites. 4. Cholelithiasis. 5. Large right and moderate left pleural effusion. 6. Mild to moderate anasarca.   US paracenthesis (05/03/2021) 2.4 L of magui fluid drained without complications.;   Abdominal Pain : She admits continued abdominal pain since her last visit. Patient reports that after taking her "water pill" she will experience abdominal pain that will radiate to his lower back and bloating.      Last visit (6/10/2021)  Patient denies any sypmtoms of abdominal pain since her last office visit.   Last visit (03/25/202) Patient reports associated abdominal pain, onset 2 months ago throughout her entire abdomen with symptoms most present at nighttime. She notes abdominal pain is alleviated with Nexium. On a scale of 1-10 she rates pain at 5. When she lays down, the pain will radiate to her left and right side of the abdomen. She notes belching is an alleviating factor. She notes symptoms worsen post prandial. She admits shortness of breath when eating.;   Constipation : She denies improvement in episodes of constipation since her last visit.   Currently she reports 1 strenuous bowel movement a day. She states that she will drink Prune juice to soften stools. Her stools are formed without blood,  mucus, or melena. Patient denies pruritus ani or rectal pain.    Last visit (6/10/2021)  Patient admits longstanding history of constipation.  Onset was 03/2020.  Patient currently admits 1 bowel movement per day.  Stools are small and hard.  Patient denies blood, mucus, or melena in stools.  Patient states that their previous bowel habits were 1 movement per a day, with soft and normal stools.  Patient denies aggravating factors and has tried Milk of magnesium nad pune juice with some relief of symptoms. Patient admits that she is no longer taking the Milk magnesium and Prune juice. Patient denies associated abdominal pains, bloating, and gas.  When a patient has a bowel movement she does not feel like she is completely emptying their bowels.  Patient denies fecal incontinence.  Patient denies recently drinking lake or well water. She admits to some recent changes in her medications with Synthroid 25 MCG. She denies taking any antibiotics in the last 6 months.  Patient admits to having a colonoscopy.  It was performed at [facility] in [place] in 2015 and it was NL.;   Elevated Liver Enzymes : She denies having any labs completed since her last visit   Last visit (6/10/2021)  Patient currently denies  jaundice,  dizziness, easy bruising.  She admits to some daily fatigue and itchiness    Last visit (4/29/2021) Most recent labs completed with PCP on (2/26/2021). Patient currently denies  jaundice,  dizziness, easy bruising, or itchiness.  She admits daily fatigue.    Last visit (11/16/2020) Patient presents today for consultation about recent elevated liver enzymes.  Patient denies a history of elevated liver enzymes.  Patient admits cold extremeties, upper abdominal pains, since her open heart surgery in (02/2020).  Patient currently denies  jaundice,  dizziness, easy bruising, itchiness, or fatigue.  Recent labs were done on (11/03/2020), which shows: AST H 205 ALT H 194 BUN H 29  RISK FACTORS:  Denies  Alcohol, drugs, travel outside the [place], NSAIDs, protein supplements, tattoos, piercings,  service, blood transfusion, family history of liver disease or cancer, personal exposure to liver diseases, longterm, rehab;

## 2021-08-09 ENCOUNTER — TELEPHONE ENCOUNTER (OUTPATIENT)
Dept: URBAN - METROPOLITAN AREA CLINIC 35 | Facility: CLINIC | Age: 76
End: 2021-08-09

## 2021-09-08 ENCOUNTER — TELEPHONE ENCOUNTER (OUTPATIENT)
Dept: URBAN - METROPOLITAN AREA CLINIC 35 | Facility: CLINIC | Age: 76
End: 2021-09-08

## 2021-09-16 ENCOUNTER — OFFICE VISIT (OUTPATIENT)
Dept: URBAN - METROPOLITAN AREA CLINIC 33 | Facility: CLINIC | Age: 76
End: 2021-09-16

## 2021-09-16 VITALS
DIASTOLIC BLOOD PRESSURE: 60 MMHG | HEIGHT: 60 IN | WEIGHT: 96 LBS | OXYGEN SATURATION: 98 % | HEART RATE: 60 BPM | SYSTOLIC BLOOD PRESSURE: 138 MMHG | BODY MASS INDEX: 18.85 KG/M2

## 2021-09-16 RX ORDER — FUROSEMIDE 20 MG/1
1 TABLET TABLET ORAL ONCE A DAY
Qty: 30 | Status: ON HOLD | COMMUNITY
Start: 2021-04-29

## 2021-09-16 RX ORDER — POTASSIUM CHLORIDE 1500 MG/1
1 TABLET WITH FOOD TABLET, FILM COATED, EXTENDED RELEASE ORAL ONCE A DAY
Qty: 30 | Status: ON HOLD | COMMUNITY

## 2021-09-16 RX ORDER — PILOCARPINE HYDROCHLORIDE 5 MG/1
1 TABLET TABLET, FILM COATED ORAL THREE TIMES A DAY
Qty: 90 | Status: ON HOLD | COMMUNITY

## 2021-09-16 RX ORDER — SPIRONOLACTONE 25 MG/1
1 TABLET TABLET, FILM COATED ORAL ONCE A DAY
Status: ACTIVE | COMMUNITY
Start: 2021-04-29

## 2021-09-16 RX ORDER — LEVOTHYROXINE SODIUM 25 UG/1
1 CAPSULE IN THE MORNING ON AN EMPTY STOMACH CAPSULE ORAL ONCE A DAY
Qty: 30 | Status: ACTIVE | COMMUNITY
Start: 2021-07-27

## 2021-09-16 RX ORDER — SILDENAFIL CITRATE 25 MG/1
1 TABLET AS NEEDED TABLET, FILM COATED ORAL ONCE A DAY
Qty: 30 | Status: ACTIVE | COMMUNITY

## 2021-09-16 RX ORDER — LEVOTHYROXINE SODIUM 25 UG/1
1 TABLET IN THE MORNING ON AN EMPTY STOMACH TABLET ORAL ONCE A DAY
Qty: 30 | Status: ON HOLD | COMMUNITY

## 2021-09-16 RX ORDER — THIAMINE HCL 100 MG
TABLET ORAL
Status: ON HOLD | COMMUNITY

## 2021-09-16 RX ORDER — BUMETANIDE 1 MG/1
1 TABLET TABLET ORAL DAILY
Qty: 90 TABLET | Refills: 1 | Status: ON HOLD | COMMUNITY

## 2021-09-16 RX ORDER — DICLOFENAC SODIUM 10 MG/G
AS DIRECTED GEL TOPICAL
Status: ON HOLD | COMMUNITY

## 2021-09-16 RX ORDER — SPIRONOLACTONE 25 MG/1
1 TABLET TABLET, FILM COATED ORAL ONCE A DAY
Qty: 30 TABLET | Refills: 0 | Status: ON HOLD | COMMUNITY
Start: 2021-03-25

## 2021-09-16 RX ORDER — NITROGLYCERIN 0.4 MG/1
AS DIRECTED TABLET SUBLINGUAL
Status: ACTIVE | COMMUNITY

## 2021-09-16 RX ORDER — CYCLOSPORINE 0.5 MG/ML
1 DROP INTO AFFECTED EYE EMULSION OPHTHALMIC TWICE A DAY
Status: ACTIVE | COMMUNITY

## 2021-09-16 NOTE — HPI-MIGRATED HPI
;   ;   ;   ;   ;   ;     Cirrhosis : Patient denies any associated symptoms at this time. She denies abdominal distention   Last visit (7/27/2021)  Patient presents today with her daughter via a telephonic visit with consent to review Liver biopsy and follow up of ascites.   She denies the continued use of Bumetanide Tablet, 1 MG. Dr Washington Estrada  advised patient to dc Bumetanide if she weighs less than 90 lbs. If she weighs more than 90 lbs he she should add 1 mg of Bumentanide. Patient now weighs 91.4.   She admits the use of Spironolactone Tablet, 25 MG 1 PO QD and states that it is helping.   She admits an appt with Dr Washington Estrada since her last visit. Patient reports that she has a f/u appt in August.   Patient had an Abd U/S completed on 6/22/2021 revealing hetergeneous hepatic parenchymal echo pattern. Cholelithiasis with thick-walled gallbladder. No ascites.   She also had a CT Liver Biopsy completed on 7/19/2021 no path has come back at this time.    Last visit (6/10/2021)  75 year- old female patient presents today for follow up of Ascites and to discuss her ultrasound guided paracentesis and lab results . She denies any abdominal distension, abdominal pain, she admits to shortness of breath, or unintentional weight gain.  She admits starting Spironolactone Tablet, 25 MG, Bumetanide 1 MG tablet. with relief of symptoms.  Patient is no longer taking Furosemide Tablet, 20 MG   Admits to the discontinuation of the Potassium Chloride ER Tablet Extended Release, 20 MEQ.  Labs drawn (06/01/2021) CBC Immunoglobulin A, Qn, Serum  736 High RBC 2.92 Low Hemoglobin  9.6 Low Hematocrit  28.7 Low MCV  98 High  CMP Glucose 117 High  BUN  100 Alert  Creatinine  1.97 High eGFR If NonAfricn Am 24 Low eGFR If Africn Am 28 Low  BUN/Creatinine Ratio 51 High  Sodium 132 Low Potassium 5.3 High Chloride 93 Low Protein, Total  9.9 High Globulin, Total 6.1 High A/G Ratio 0.6 Low AST  96 High  ALT  58 High  Iron and TIBC UIBC  374 High Iron Saturation 12 Low  Immunoglobulin G, Qn, Serum 01 4457 High  Vitamin B12  1863 High  Reticulocyte Count 2.9 High  MRI ABD (05/11/2021) 1. Limited study due to respiratory motion artifact. 2.  Hepatic cirrhosis. No define suspicious hepatic lesions within limitations of the study. Recommend continued surveillance. 3 . Moderate abdominal ascites. 4. Cholelithiasis. 5. Large right and moderate left pleural effusion. 6. Mild to moderate anasarca.   US paracenthesis (05/03/2021) 2.4 L of magui fluid drained without complications.;   Early Satiety : Patient reports improvement in early satiety. Her appetite has increased since her last visit.      Last visit (7/27/2021)  She denies having a Nutritional consult since her last visit. Patient admits mild improvement in appetite and early satiety.   Last visit (6/10/2021)  Patient admits to episodes of early satiety since her last office visit.  Last visit (03/25/2021) She admits associated early satiety, onset 02/2020. She reports eating 3 small meals per day with one snack at bedtime.;   Bloating :    Last visit (7/27/2021)  She denies any episodes of bloating at this time.      Patient admits improvement in episodes of bloating.    Last visit (6/10/2021)  Patient denies continued bloating.    Last visit (4/29/2021)  75 year old female who presents today with her daughter Jany for a follow up of abdominal destention. Daughter Jany states patient saw Cardiologist Dr. Flanagan yesterday and he suggest she keep appointment with GI for possbile repeat paracentesis.          Of note, daughter state she didn't realize labs were ordered and the MRI was r/s due to patient having a pace maker and could not have MRI at location she was initially schedued at. MRI is set for May 11, 2021         She notes continued bloating in epigastric region, and shortness of breath. She states her left and right side of abdomen improved, however her epigastric region remained very hard and bloated.          Dr. Flanagan increased the dosage or Spironolactone to 50 mg QD and started her on Viagra 25 mg 1 QD.    Last visit (03/25/2021) 75 year old female who presents today for a consultation of abdominal distention that "feels full of fluid". She notes onset of symptoms occurred (02/2020) after her  surgery, and reports daily episodes. She notes belching is an alleviating factor. She denies any aggravating factors.  She notes associated shortness of breath.   Of note: Patient reports only sleeping 3 hours per night due to discomfort in abdomen for the past 2 months.   Patient had right carotid endarterectomy with bovine pericardial patch angioplasty with intraoperative shunt placement. (01/29/2020)  Last visit (11/16/2020)  76 y/o female patient admits recent onset of bloating. Patient admits that her bloating issues started to occur after her open heart surgery. Patient admits associated symptoms such abdominal pain, indigestion. Patient admits abdominal tightness, early satiety, and shortness of breath. Patient states that taking Nexium OTC alleviates some symptoms.  Patient admits 1  formed bowel movements per day.  Patient denies mucus, melena, or blood in stools.   Patient is having bloating throughout the day. Patient states sometimes urgency with bowel movements and other times more bloating with bowel movements. It is a gaseous distention. There is no improvement with defecation.   Patient admits having a colonoscopy/EGD in 2016 with normal findings per patient. Patient admits family hx of colonic disease/cancer/polyps.;   Abdominal Pain : Patient reports continued abdominal pain that is located in her lower abdomen that will radiate to her back after taking Spirolactone.     Last visit (7/27/2021)  She admits continued abdominal pain since her last visit. Patient reports that after taking her "water pill" she will experience abdominal pain that will radiate to his lower back and bloating.      Last visit (6/10/2021)  Patient denies any sypmtoms of abdominal pain since her last office visit.   Last visit (03/25/202) Patient reports associated abdominal pain, onset 2 months ago throughout her entire abdomen with symptoms most present at nighttime. She notes abdominal pain is alleviated with Nexium. On a scale of 1-10 she rates pain at 5. When she lays down, the pain will radiate to her left and right side of the abdomen. She notes belching is an alleviating factor. She notes symptoms worsen post prandial. She admits shortness of breath when eating.;   Constipation : Patient presents today for a follow up of constipation.  Has responded to prune juice   Currently reports 1 bowel movement a day without strain. Her stools alternate between soft and hard. She denies any blood or mucus present.      Last visit (7/27/2021)  She denies improvement in episodes of constipation since her last visit.   Currently she reports 1 strenuous bowel movement a day. She states that she will drink Prune juice to soften stools. Her stools are formed without blood,  mucus, or melena. Patient denies pruritus ani or rectal pain.    Last visit (6/10/2021)  Patient admits longstanding history of constipation.  Onset was 03/2020.  Patient currently admits 1 bowel movement per day.  Stools are small and hard.  Patient denies blood, mucus, or melena in stools.  Patient states that their previous bowel habits were 1 movement per a day, with soft and normal stools.  Patient denies aggravating factors and has tried Milk of magnesium nad pune juice with some relief of symptoms. Patient admits that she is no longer taking the Milk magnesium and Prune juice. Patient denies associated abdominal pains, bloating, and gas.  When a patient has a bowel movement she does not feel like she is completely emptying their bowels.  Patient denies fecal incontinence.  Patient denies recently drinking lake or well water. She admits to some recent changes in her medications with Synthroid 25 MCG. She denies taking any antibiotics in the last 6 months.  Patient admits to having a colonoscopy.  It was performed at [facility] in [place] in 2015 and it was NL.;   Elevated Liver Enzymes : Her last labs were completed 8/12/2021 with her Nephrologist no liver enzymes were checked at that time.   Her TSH level was checked with another physician results was 4.6 (H). Previously on 5/7 her TSH level was 14.6.      Last visit (7/27/2021)  She denies having any labs completed since her last visit   Last visit (6/10/2021)  Patient currently denies  jaundice,  dizziness, easy bruising.  She admits to some daily fatigue and itchiness    Last visit (4/29/2021) Most recent labs completed with PCP on (2/26/2021). Patient currently denies  jaundice,  dizziness, easy bruising, or itchiness.  She admits daily fatigue.    Last visit (11/16/2020) Patient presents today for consultation about recent elevated liver enzymes.  Patient denies a history of elevated liver enzymes.  Patient admits cold extremeties, upper abdominal pains, since her open heart surgery in (02/2020).  Patient currently denies  jaundice,  dizziness, easy bruising, itchiness, or fatigue.  Recent labs were done on (11/03/2020), which shows: AST H 205 ALT H 194 BUN H 29  RISK FACTORS:  Denies  Alcohol, drugs, travel outside the [place], NSAIDs, protein supplements, tattoos, piercings,  service, blood transfusion, family history of liver disease or cancer, personal exposure to liver diseases, longterm, rehab;

## 2021-09-16 NOTE — EXAM-MIGRATED EXAMINATIONS
ABDOMEN: - bowel sounds present, no masses palpable, no organomegaly , no rebound tenderness, soft, nontender, nondistended;   EXTREMITIES: - no clubbing, cyanosis, or edema;

## 2021-10-22 ENCOUNTER — TELEPHONE ENCOUNTER (OUTPATIENT)
Dept: URBAN - METROPOLITAN AREA CLINIC 35 | Facility: CLINIC | Age: 76
End: 2021-10-22

## 2021-12-13 ENCOUNTER — OFFICE VISIT (OUTPATIENT)
Dept: URBAN - METROPOLITAN AREA CLINIC 33 | Facility: CLINIC | Age: 76
End: 2021-12-13
Payer: MEDICARE

## 2021-12-13 VITALS
SYSTOLIC BLOOD PRESSURE: 140 MMHG | HEIGHT: 60 IN | HEART RATE: 64 BPM | BODY MASS INDEX: 19.44 KG/M2 | OXYGEN SATURATION: 98 % | WEIGHT: 99 LBS | DIASTOLIC BLOOD PRESSURE: 62 MMHG

## 2021-12-13 DIAGNOSIS — K74.60 UNSPECIFIED CIRRHOSIS OF LIVER: ICD-10-CM

## 2021-12-13 DIAGNOSIS — N28.9 RENAL INSUFFICIENCY: ICD-10-CM

## 2021-12-13 DIAGNOSIS — R10.12 LEFT UPPER QUADRANT ABDOMINAL PAIN: ICD-10-CM

## 2021-12-13 DIAGNOSIS — R68.81 EARLY SATIETY: ICD-10-CM

## 2021-12-13 DIAGNOSIS — K80.20 GALLSTONES: ICD-10-CM

## 2021-12-13 DIAGNOSIS — R18.8 OTHER ASCITES: ICD-10-CM

## 2021-12-13 PROCEDURE — 99214 OFFICE O/P EST MOD 30 MIN: CPT | Performed by: INTERNAL MEDICINE

## 2021-12-13 RX ORDER — DICLOFENAC SODIUM 10 MG/G
AS DIRECTED GEL TOPICAL
Status: ON HOLD | COMMUNITY

## 2021-12-13 RX ORDER — B-COMPLEX WITH VITAMIN C
AS DIRECTED TABLET ORAL ONCE A DAY
Status: ACTIVE | COMMUNITY

## 2021-12-13 RX ORDER — SPIRONOLACTONE 25 MG/1
1 TABLET TABLET, FILM COATED ORAL ONCE A DAY
Qty: 30 TABLET | Refills: 0 | Status: ACTIVE | COMMUNITY
Start: 2021-03-25

## 2021-12-13 RX ORDER — LEVOTHYROXINE SODIUM 25 UG/1
1 CAPSULE IN THE MORNING ON AN EMPTY STOMACH CAPSULE ORAL ONCE A DAY
Qty: 30 | Status: ACTIVE | COMMUNITY
Start: 2021-07-27

## 2021-12-13 RX ORDER — PILOCARPINE HYDROCHLORIDE 5 MG/1
1 TABLET TABLET, FILM COATED ORAL THREE TIMES A DAY
Qty: 90 | Status: ON HOLD | COMMUNITY

## 2021-12-13 RX ORDER — POTASSIUM CHLORIDE 1500 MG/1
1 TABLET WITH FOOD TABLET, FILM COATED, EXTENDED RELEASE ORAL ONCE A DAY
Qty: 30 | Status: ON HOLD | COMMUNITY

## 2021-12-13 RX ORDER — LEVOTHYROXINE SODIUM 25 UG/1
1 TABLET IN THE MORNING ON AN EMPTY STOMACH TABLET ORAL ONCE A DAY
Qty: 30 | Status: ON HOLD | COMMUNITY

## 2021-12-13 RX ORDER — NITROGLYCERIN 0.4 MG/1
AS DIRECTED TABLET SUBLINGUAL
Status: ACTIVE | COMMUNITY

## 2021-12-13 RX ORDER — FUROSEMIDE 20 MG/1
1 TABLET TABLET ORAL ONCE A DAY
Qty: 30 | Status: ON HOLD | COMMUNITY
Start: 2021-04-29

## 2021-12-13 RX ORDER — BUMETANIDE 1 MG/1
1 TABLET TABLET ORAL DAILY
Qty: 90 TABLET | Refills: 1 | Status: ON HOLD | COMMUNITY

## 2021-12-13 RX ORDER — THIAMINE HCL 100 MG
TABLET ORAL
Status: ON HOLD | COMMUNITY

## 2021-12-13 RX ORDER — LOSARTAN POTASSIUM 50 MG/1
1 TABLET TABLET ORAL TWICE A DAY
Status: ACTIVE | COMMUNITY

## 2021-12-13 RX ORDER — SILDENAFIL CITRATE 25 MG/1
1 TABLET AS NEEDED TABLET, FILM COATED ORAL TWICE A DAY
Qty: 60 TABLET | Status: ACTIVE | COMMUNITY

## 2021-12-13 RX ORDER — CYCLOSPORINE 0.5 MG/ML
1 DROP INTO AFFECTED EYE EMULSION OPHTHALMIC TWICE A DAY
Status: ACTIVE | COMMUNITY

## 2021-12-13 RX ORDER — SPIRONOLACTONE 25 MG/1
1 TABLET TABLET, FILM COATED ORAL ONCE A DAY
OUTPATIENT
Start: 2021-03-25

## 2021-12-13 NOTE — HPI-ELEVATED LIVER ENZYMES
Patient's most recent labs were completed on 12/6/2021. She denies any associated symptoms at this time.     Last visit (9/16/2021)  Her last labs were completed 8/12/2021 with her Nephrologist no liver enzymes were checked at that time.   Her TSH level was checked with another physician results was 4.6 (H). Previously on 5/7 her TSH level was 14.6.

## 2021-12-13 NOTE — HPI-CIRRHOSIS
Patient presents today for a follow up of Cirrhosis. She admits/denies any associated symptoms at this time. Most recent labs were completed 12/6/2021.    She reports that after eating she will experience abdominal pain that will last for several hours. She will then lay down and symptoms will resolve on its on.    MELD Score: 8 (labs- 12/6/2021)   Last visit (9/16/2021)  Patient denies any associated symptoms at this time. She denies abdominal distention

## 2021-12-13 NOTE — HPI-CONSTIPATION
She denies improvement in episodes of constipation with the use of prune juice. Currently reports 1 bowel movement every other day with drinking prune juice without strain. Her stools are type 2 on a bristol scale without blood, mucus, or melena.   Patient admits rectal pain after a bowel movement that will last for a short period of time and then symptoms will resolve.   Last visit (9/16/2021)  Patient presents today for a follow up of constipation.  Has responded to prune juice   Currently reports 1 bowel movement a day without strain. Her stools alternate between soft and hard. She denies any blood or mucus present.

## 2021-12-13 NOTE — HPI-ABDOMINAL PAIN
She admits continued abdominal pain since her last visit. Patient had a U/S completed on 10/12/2021.  She reports a constant continued abdominal pain that is located in her LUQ that she describes as a pulling.     Last visit (9/16/2021)  Patient reports continued abdominal pain that is located in her lower abdomen that will radiate to her back after taking Spirolactone.

## 2021-12-13 NOTE — HPI-EARLY SATIETY
She admits continued episodes of early satiety since her last visit. She eats several small meals a day to try and maintain her symptoms.    Last visit (9/16/2021)  Patient reports improvement in early satiety. Her appetite has increased since her last visit.

## 2022-01-25 ENCOUNTER — TELEPHONE ENCOUNTER (OUTPATIENT)
Dept: URBAN - METROPOLITAN AREA CLINIC 36 | Facility: CLINIC | Age: 77
End: 2022-01-25

## 2022-01-26 ENCOUNTER — OFFICE VISIT (OUTPATIENT)
Dept: URBAN - METROPOLITAN AREA CLINIC 36 | Facility: CLINIC | Age: 77
End: 2022-01-26
Payer: MEDICARE

## 2022-01-26 VITALS
OXYGEN SATURATION: 98 % | DIASTOLIC BLOOD PRESSURE: 58 MMHG | BODY MASS INDEX: 19.63 KG/M2 | WEIGHT: 100 LBS | HEIGHT: 60 IN | SYSTOLIC BLOOD PRESSURE: 100 MMHG | HEART RATE: 60 BPM

## 2022-01-26 DIAGNOSIS — R10.12 LEFT UPPER QUADRANT ABDOMINAL PAIN: ICD-10-CM

## 2022-01-26 DIAGNOSIS — N28.9 RENAL INSUFFICIENCY: ICD-10-CM

## 2022-01-26 DIAGNOSIS — K80.20 GALLSTONES: ICD-10-CM

## 2022-01-26 DIAGNOSIS — K74.69 OTHER CIRRHOSIS OF LIVER: ICD-10-CM

## 2022-01-26 DIAGNOSIS — R18.8 OTHER ASCITES: ICD-10-CM

## 2022-01-26 DIAGNOSIS — R68.81 EARLY SATIETY: ICD-10-CM

## 2022-01-26 PROCEDURE — 99214 OFFICE O/P EST MOD 30 MIN: CPT | Performed by: INTERNAL MEDICINE

## 2022-01-26 RX ORDER — SILDENAFIL CITRATE 25 MG/1
1 TABLET AS NEEDED TABLET, FILM COATED ORAL TWICE A DAY
Qty: 60 TABLET | Status: ACTIVE | COMMUNITY

## 2022-01-26 RX ORDER — BUMETANIDE 1 MG/1
1 TABLET TABLET ORAL DAILY
OUTPATIENT

## 2022-01-26 RX ORDER — PILOCARPINE HYDROCHLORIDE 5 MG/1
1 TABLET TABLET, FILM COATED ORAL THREE TIMES A DAY
Qty: 90 | Status: ON HOLD | COMMUNITY

## 2022-01-26 RX ORDER — LEVOTHYROXINE SODIUM 25 UG/1
1 CAPSULE IN THE MORNING ON AN EMPTY STOMACH CAPSULE ORAL ONCE A DAY
Qty: 30 | Status: ACTIVE | COMMUNITY
Start: 2021-07-27

## 2022-01-26 RX ORDER — LOSARTAN POTASSIUM 50 MG/1
1 TABLET TABLET ORAL TWICE A DAY
Status: ACTIVE | COMMUNITY

## 2022-01-26 RX ORDER — THIAMINE HCL 100 MG
TABLET ORAL
Status: ON HOLD | COMMUNITY

## 2022-01-26 RX ORDER — DICLOFENAC SODIUM 10 MG/G
AS DIRECTED GEL TOPICAL
Status: ON HOLD | COMMUNITY

## 2022-01-26 RX ORDER — NITROGLYCERIN 0.4 MG/1
AS DIRECTED TABLET SUBLINGUAL
Status: ACTIVE | COMMUNITY

## 2022-01-26 RX ORDER — LEVOTHYROXINE SODIUM 25 UG/1
1 TABLET IN THE MORNING ON AN EMPTY STOMACH TABLET ORAL ONCE A DAY
Qty: 30 | Status: ON HOLD | COMMUNITY

## 2022-01-26 RX ORDER — SPIRONOLACTONE 25 MG/1
1 TABLET TABLET, FILM COATED ORAL ONCE A DAY
Status: ACTIVE | COMMUNITY
Start: 2021-03-25

## 2022-01-26 RX ORDER — BUMETANIDE 1 MG/1
1 TABLET TABLET ORAL DAILY
Qty: 90 TABLET | Refills: 1 | Status: ON HOLD | COMMUNITY

## 2022-01-26 RX ORDER — FUROSEMIDE 20 MG/1
1 TABLET TABLET ORAL ONCE A DAY
Qty: 30 | Status: ON HOLD | COMMUNITY
Start: 2021-04-29

## 2022-01-26 RX ORDER — POTASSIUM CHLORIDE 1500 MG/1
1 TABLET WITH FOOD TABLET, FILM COATED, EXTENDED RELEASE ORAL ONCE A DAY
Qty: 30 | Status: ON HOLD | COMMUNITY

## 2022-01-26 RX ORDER — SPIRONOLACTONE 25 MG/1
1 TABLET TABLET, FILM COATED ORAL ONCE A DAY
OUTPATIENT

## 2022-01-26 RX ORDER — B-COMPLEX WITH VITAMIN C
AS DIRECTED TABLET ORAL ONCE A DAY
Status: ACTIVE | COMMUNITY

## 2022-01-26 RX ORDER — CYCLOSPORINE 0.5 MG/ML
1 DROP INTO AFFECTED EYE EMULSION OPHTHALMIC TWICE A DAY
Status: ACTIVE | COMMUNITY

## 2022-01-26 NOTE — HPI-CIRRHOSIS
Patient daughter report gradual increase abdominal distention with associated S.O.B  and upper abdominal pain that occur intermittently throughout the day.   Denies lower extremity swelling.  Admit patient seem to be forgetful more than usual over the past month.  She denies completing any labs since last visit.  Daughter state patient stopped taking  Bumetanide a while ago but reports the use of it once yesterday.  She has been taking Spironolactone 25 mg 1 QD.   Complications of liver disease include: No Jaundice: No  Hepatic Encephalopathy : No  Ascites : Unure - per patient and daughter they believe so and would like a paracentesis.  Spontaneous Bacterial Peritonitis (ascites fluid infection): No  Variceal hemorrhage : No  Portal Vein Thrombosis : No  Muscle Mass Loss: No  Weight Loss: No  Sleeping habits: Unable to sleep at night will sleep 3hrs during the day.    Last visit (12/13/2021) Patient presents today for a follow up of Cirrhosis. She admits/denies any associated symptoms at this time. Most recent labs were completed 12/6/2021.    She reports that after eating she will experience abdominal pain that will last for several hours. She will then lay down and symptoms will resolve on its on.    MELD Score: 8 (labs- 12/6/2021)   Last visit (9/16/2021)  Patient denies any associated symptoms at this time. She denies abdominal distention

## 2022-01-26 NOTE — HPI-ABDOMINAL PAIN
76 year old female who presents today for a follow up of upper abdominal pain and generalized abdominal swelling. Patient had a GES completed yesterday at [facility] with normal results as well as a chest X-ray that revealed cardiomegaly and bilateral pleural effusions.   Patient daughter report over the past 2 weeks gradual increase abdominal distention. Began to experience S.O.B  2 weeks ago, but didn't tell daughter until 23 days ago.  Yesterday patient  began to have upper abdominal pain that occur intermittently throughout the day.    Denies lower extremity swelling.  Admit patient seem to be forgetful more than usual over the past month.  She denies completing any labs since last visit .  Daughter state patient stopped taking  Bumetanide a while ago.  Has been taking Spironolactone 25 mg 1 QD. She also complains of chest pain that is located slightly below her heart that will last for 30 minutes, she reports that she will massage her chest and it will go away. She is concerned that it may have something to do with her pacemaker she reports that her last visit with the cardiologist was in November. She admits that symptoms have become more frequent. She denies taking any medication to help relieve symptoms.    Last visit (12/13/2021)  She admits continued abdominal pain since her last visit. Patient had a U/S completed on 10/12/2021.  She reports a constant continued abdominal pain that is located in her LUQ that she describes as a pulling.     Last visit (9/16/2021)  Patient reports continued abdominal pain that is located in her lower abdomen that will radiate to her back after taking Spironolactone.

## 2022-01-26 NOTE — HPI-EARLY SATIETY
Patient admits continued episodes of early satiety.     Last visit (12/13/2021) She admits continued episodes of early satiety since her last visit. She eats several small meals a day to try and maintain her symptoms.    Last visit (9/16/2021)  Patient reports improvement in early satiety. Her appetite has increased since her last visit.

## 2022-01-26 NOTE — HPI-CONSTIPATION
Currently reports 1-2 small bowel movement every other day with drinking prune juice and without strain. Her stools are small and formed without blood, mucus, or melena.   She reports taking Vitamin C at this time and has reduced the amount of prune juice that she intakes and it has helped with the constipation.    Last visit (12/13/2021) She denies improvement in episodes of constipation with the use of prune juice. Currently reports 1 bowel movement every other day with drinking prune juice without strain. Her stools are type 2 on a bristol scale without blood, mucus, or melena.   Patient admits rectal pain after a bowel movement that will last for a short period of time and then symptoms will resolve.   Last visit (9/16/2021)  Patient presents today for a follow up of constipation.  Has responded to prune juice   Currently reports 1 bowel movement a day without strain. Her stools alternate between soft and hard. She denies any blood or mucus present.

## 2022-01-26 NOTE — PHYSICAL EXAM GASTROINTESTINAL
Abdomen , soft, nontender, nondistended , no guarding or rigidity , no masses palpable , normal bowel sounds , Liver and Spleen , no hepatomegaly present , no hepatosplenomegaly , liver nontender , spleen not palpable. Ventral hernia present . Free fluid present ( shifting dullness )

## 2022-01-26 NOTE — HPI-ELEVATED LIVER ENZYMES
She denies completing any labs since last visit . Currently admits daily dizziness if anything is not in her eye level, if she has to adjust her head at all she will become dizzy. She also reports that she experience daily fatigue.   She denies seeing an ENT at this time.   She denies jaundice, chills, RUQ pains, or easy bruising.    Last visit (12/13/2021) Patient's most recent labs were completed on 12/6/2021. She denies any associated symptoms at this time.     Last visit (9/16/2021)  Her last labs were completed 8/12/2021 with her Nephrologist no liver enzymes were checked at that time.   Her TSH level was checked with another physician results was 4.6 (H). Previously on 5/7 her TSH level was 14.6.

## 2022-01-31 ENCOUNTER — OFFICE VISIT (OUTPATIENT)
Dept: URBAN - METROPOLITAN AREA CLINIC 33 | Facility: CLINIC | Age: 77
End: 2022-01-31

## 2022-03-07 ENCOUNTER — LAB OUTSIDE AN ENCOUNTER (OUTPATIENT)
Dept: URBAN - METROPOLITAN AREA CLINIC 36 | Facility: CLINIC | Age: 77
End: 2022-03-07

## 2022-03-10 ENCOUNTER — OFFICE VISIT (OUTPATIENT)
Dept: URBAN - METROPOLITAN AREA CLINIC 33 | Facility: CLINIC | Age: 77
End: 2022-03-10
Payer: MEDICARE

## 2022-03-10 VITALS
OXYGEN SATURATION: 99 % | HEART RATE: 63 BPM | WEIGHT: 92 LBS | DIASTOLIC BLOOD PRESSURE: 75 MMHG | HEIGHT: 60 IN | BODY MASS INDEX: 18.06 KG/M2 | SYSTOLIC BLOOD PRESSURE: 140 MMHG

## 2022-03-10 DIAGNOSIS — R10.12 LEFT UPPER QUADRANT ABDOMINAL PAIN: ICD-10-CM

## 2022-03-10 DIAGNOSIS — R68.81 EARLY SATIETY: ICD-10-CM

## 2022-03-10 DIAGNOSIS — E87.5 HYPERKALEMIA: ICD-10-CM

## 2022-03-10 DIAGNOSIS — K74.60 UNSPECIFIED CIRRHOSIS OF LIVER: ICD-10-CM

## 2022-03-10 DIAGNOSIS — N28.9 RENAL INSUFFICIENCY: ICD-10-CM

## 2022-03-10 DIAGNOSIS — R18.8 OTHER ASCITES: ICD-10-CM

## 2022-03-10 DIAGNOSIS — K80.20 GALLSTONES: ICD-10-CM

## 2022-03-10 PROCEDURE — 99214 OFFICE O/P EST MOD 30 MIN: CPT | Performed by: INTERNAL MEDICINE

## 2022-03-10 RX ORDER — LEVOTHYROXINE SODIUM 25 UG/1
1 TABLET IN THE MORNING ON AN EMPTY STOMACH TABLET ORAL ONCE A DAY
Qty: 30 | Status: ON HOLD | COMMUNITY

## 2022-03-10 RX ORDER — FUROSEMIDE 20 MG/1
1 TABLET TABLET ORAL ONCE A DAY
Qty: 30 | Status: ON HOLD | COMMUNITY
Start: 2021-04-29

## 2022-03-10 RX ORDER — BUMETANIDE 1 MG/1
1 TABLET TABLET ORAL DAILY
Status: ON HOLD | COMMUNITY

## 2022-03-10 RX ORDER — DICLOFENAC SODIUM 10 MG/G
AS DIRECTED GEL TOPICAL
Status: ON HOLD | COMMUNITY

## 2022-03-10 RX ORDER — CYCLOSPORINE 0.5 MG/ML
1 DROP INTO AFFECTED EYE EMULSION OPHTHALMIC TWICE A DAY
Status: ACTIVE | COMMUNITY

## 2022-03-10 RX ORDER — B-COMPLEX WITH VITAMIN C
AS DIRECTED TABLET ORAL ONCE A DAY
Status: ACTIVE | COMMUNITY

## 2022-03-10 RX ORDER — NITROGLYCERIN 0.4 MG/1
AS DIRECTED TABLET SUBLINGUAL
Status: ACTIVE | COMMUNITY

## 2022-03-10 RX ORDER — POTASSIUM CHLORIDE 1500 MG/1
1 TABLET WITH FOOD TABLET, FILM COATED, EXTENDED RELEASE ORAL ONCE A DAY
Qty: 30 | Status: ON HOLD | COMMUNITY

## 2022-03-10 RX ORDER — LEVOTHYROXINE SODIUM 25 UG/1
1 CAPSULE IN THE MORNING ON AN EMPTY STOMACH CAPSULE ORAL ONCE A DAY
Qty: 30 | Status: ACTIVE | COMMUNITY
Start: 2021-07-27

## 2022-03-10 RX ORDER — BUMETANIDE 1 MG/1
1 TABLET TABLET ORAL DAILY
OUTPATIENT

## 2022-03-10 RX ORDER — THIAMINE HCL 100 MG
TABLET ORAL
Status: ON HOLD | COMMUNITY

## 2022-03-10 RX ORDER — SPIRONOLACTONE 25 MG/1
1 TABLET TABLET, FILM COATED ORAL ONCE A DAY
OUTPATIENT

## 2022-03-10 RX ORDER — SPIRONOLACTONE 25 MG/1
1 TABLET TABLET, FILM COATED ORAL ONCE A DAY
Status: ACTIVE | COMMUNITY

## 2022-03-10 RX ORDER — LOSARTAN POTASSIUM 50 MG/1
1 TABLET TABLET ORAL TWICE A DAY
Status: ACTIVE | COMMUNITY

## 2022-03-10 RX ORDER — PILOCARPINE HYDROCHLORIDE 5 MG/1
1 TABLET TABLET, FILM COATED ORAL THREE TIMES A DAY
Qty: 90 | Status: ON HOLD | COMMUNITY

## 2022-03-10 RX ORDER — SILDENAFIL CITRATE 25 MG/1
1 TABLET AS NEEDED TABLET, FILM COATED ORAL TWICE A DAY
Qty: 60 TABLET | Status: ACTIVE | COMMUNITY

## 2022-03-10 NOTE — HPI-EARLY SATIETY
Patient admits continued episodes of early satiety.   Of last note an EGD should be scheduled when she has improved clinically

## 2022-03-10 NOTE — HPI-ELEVATED LIVER ENZYMES
She admits  completing any labs since last visit .   Currently admits daily dizziness if anything is not in her eye level, if she has to adjust her head at all she will become dizzy. She also reports that she experiences daily fatigue.   She denies jaundice, chills or easy bruising. Patient admits RUQ discomfort and epigastric "tightness"

## 2022-03-10 NOTE — HPI-CIRRHOSIS
Patient's daughter admits improvement in abdominal distention but continues to experience S.O.B  and upper abdominal discomfort which occurs intermittently, typically when she feels hungry.  Admits  improvement in lower extremity swelling.  Admit patient seem to be forgetful more than usual over the past month.  Patient has been taking Spironolactone 25 mg with improvement of her symptoms, but due to the side effects of her abdominal discomfort  tightness all around her abdominal, chest and upper extremities, she does not take the medication daily.   Due to the side effects of abdominal discomfort, she no longer takes Bumetanide 1 MG.  Recent lab results showing abnormal findings (03/08/2022)  BUN1 92 High Creatinine 1.78 High eGFR 29 Low  BUN/Creatinine Ratio 52 High  Sodium  129 Low Potassium 5.6 High Carbon Dioxide, Total 18 Low Protein, Total  8.7 High Globulin, Total 4.9 High A/G Ratio 0.8 Low AST (SGOT) 45 High   Complications of liver disease include: No Jaundice: No  Hepatic Encephalopathy : No  Ascites : Unure - per patient and daughter have not done any paracentesis.  Spontaneous Bacterial Peritonitis (ascites fluid infection): No  Variceal hemorrhage : No  Portal Vein Thrombosis : No  Muscle Mass Loss: No  Weight Loss: Yes Sleeping habits: Unable to sleep at night will sleep 2-4 hrs during the day.

## 2022-03-10 NOTE — HPI-ABDOMINAL PAIN
Patient presents today for a follow up of abdominal pain. She admits improvement in abdominal pain since her last visit, but admits persistence  of intermittent discomfort.  Patient admits  making any dietary modifications since her last visit.   Patient has been taking Spironolactone 25 mg with improvement of her symptoms, but due to the side effects of her abdominal discomfort  tightness all around her abdominal, chest and upper extremities, she does not take the medication daily.   Due to the side effects of abdominal discomfort, she no longer takes Bumetanide 1 MG.  Patient admits abdominal discomfort, bloating, epigastric discomfort, SOB and early satiety.

## 2022-03-10 NOTE — HPI-CONSTIPATION
She admits improvement in episodes of constipation since her last visit. Currently she reports 1 formed bowel movements daily without strain. Her stools are formed without blood, mucus, or melena.

## 2022-05-06 ENCOUNTER — LAB OUTSIDE AN ENCOUNTER (OUTPATIENT)
Dept: URBAN - METROPOLITAN AREA CLINIC 33 | Facility: CLINIC | Age: 77
End: 2022-05-06

## 2022-05-13 ENCOUNTER — LAB OUTSIDE AN ENCOUNTER (OUTPATIENT)
Dept: URBAN - METROPOLITAN AREA CLINIC 33 | Facility: CLINIC | Age: 77
End: 2022-05-13

## 2022-06-06 ENCOUNTER — LAB OUTSIDE AN ENCOUNTER (OUTPATIENT)
Dept: URBAN - METROPOLITAN AREA CLINIC 33 | Facility: CLINIC | Age: 77
End: 2022-06-06

## 2022-06-09 LAB
A/G RATIO: 1.1
ALBUMIN: 4.3
ALKALINE PHOSPHATASE: 78
ALT (SGPT): 19
AST (SGOT): 30
BILIRUBIN, TOTAL: 0.4
BUN/CREATININE RATIO: 41
BUN: 44
CALCIUM: 9.5
CARBON DIOXIDE, TOTAL: 25
CHLORIDE: 101
CREATININE: 1.08
EGFR: 53
GLOBULIN, TOTAL: 3.8
GLUCOSE: 92
POTASSIUM: 5.2
PROTEIN, TOTAL: 8.1
SODIUM: 139

## 2022-06-13 ENCOUNTER — OFFICE VISIT (OUTPATIENT)
Dept: URBAN - METROPOLITAN AREA CLINIC 33 | Facility: CLINIC | Age: 77
End: 2022-06-13
Payer: MEDICARE

## 2022-06-13 ENCOUNTER — TELEPHONE ENCOUNTER (OUTPATIENT)
Dept: URBAN - METROPOLITAN AREA CLINIC 36 | Facility: CLINIC | Age: 77
End: 2022-06-13

## 2022-06-13 VITALS
BODY MASS INDEX: 19.04 KG/M2 | WEIGHT: 97 LBS | OXYGEN SATURATION: 98 % | HEART RATE: 61 BPM | DIASTOLIC BLOOD PRESSURE: 66 MMHG | HEIGHT: 60 IN | SYSTOLIC BLOOD PRESSURE: 110 MMHG

## 2022-06-13 DIAGNOSIS — N28.9 RENAL INSUFFICIENCY: ICD-10-CM

## 2022-06-13 DIAGNOSIS — K74.60 UNSPECIFIED CIRRHOSIS OF LIVER: ICD-10-CM

## 2022-06-13 DIAGNOSIS — R18.8 OTHER ASCITES: ICD-10-CM

## 2022-06-13 DIAGNOSIS — R10.12 LEFT UPPER QUADRANT ABDOMINAL PAIN: ICD-10-CM

## 2022-06-13 DIAGNOSIS — R68.81 EARLY SATIETY: ICD-10-CM

## 2022-06-13 DIAGNOSIS — E87.5 HYPERKALEMIA: ICD-10-CM

## 2022-06-13 DIAGNOSIS — K80.20 GALLSTONES: ICD-10-CM

## 2022-06-13 DIAGNOSIS — D64.89 ANEMIA DUE TO OTHER CAUSE, NOT CLASSIFIED: ICD-10-CM

## 2022-06-13 PROBLEM — 442076002: Status: ACTIVE | Noted: 2021-03-25

## 2022-06-13 PROBLEM — 14140009: Status: ACTIVE | Noted: 2022-03-10

## 2022-06-13 PROBLEM — 236423003: Status: ACTIVE | Noted: 2021-06-10

## 2022-06-13 PROBLEM — 301715003: Status: ACTIVE | Noted: 2021-12-13

## 2022-06-13 PROBLEM — 271737000: Status: ACTIVE | Noted: 2022-06-13

## 2022-06-13 PROCEDURE — 99214 OFFICE O/P EST MOD 30 MIN: CPT | Performed by: INTERNAL MEDICINE

## 2022-06-13 RX ORDER — BUMETANIDE 1 MG/1
1 TABLET TABLET ORAL DAILY
Status: ACTIVE | COMMUNITY

## 2022-06-13 RX ORDER — BUMETANIDE 1 MG/1
1 TABLET TABLET ORAL DAILY
OUTPATIENT

## 2022-06-13 RX ORDER — FUROSEMIDE 20 MG/1
1 TABLET TABLET ORAL ONCE A DAY
Qty: 30 | Status: ON HOLD | COMMUNITY
Start: 2021-04-29

## 2022-06-13 RX ORDER — B-COMPLEX WITH VITAMIN C
AS DIRECTED TABLET ORAL ONCE A DAY
Status: ACTIVE | COMMUNITY

## 2022-06-13 RX ORDER — POTASSIUM CHLORIDE 1500 MG/1
1 TABLET WITH FOOD TABLET, FILM COATED, EXTENDED RELEASE ORAL ONCE A DAY
Qty: 30 | Status: ON HOLD | COMMUNITY

## 2022-06-13 RX ORDER — CYCLOSPORINE 0.5 MG/ML
1 DROP INTO AFFECTED EYE EMULSION OPHTHALMIC TWICE A DAY
Status: ACTIVE | COMMUNITY

## 2022-06-13 RX ORDER — THIAMINE HCL 100 MG
TABLET ORAL
Status: ON HOLD | COMMUNITY

## 2022-06-13 RX ORDER — PILOCARPINE HYDROCHLORIDE 5 MG/1
1 TABLET TABLET, FILM COATED ORAL THREE TIMES A DAY
Qty: 90 | Status: ON HOLD | COMMUNITY

## 2022-06-13 RX ORDER — SPIRONOLACTONE 25 MG/1
1 TABLET TABLET, FILM COATED ORAL ONCE A DAY
Status: ON HOLD | COMMUNITY

## 2022-06-13 RX ORDER — DICLOFENAC SODIUM 10 MG/G
AS DIRECTED GEL TOPICAL
Status: ON HOLD | COMMUNITY

## 2022-06-13 RX ORDER — NITROGLYCERIN 0.4 MG/1
AS DIRECTED TABLET SUBLINGUAL
Status: ACTIVE | COMMUNITY

## 2022-06-13 RX ORDER — SILDENAFIL CITRATE 25 MG/1
1 TABLET AS NEEDED TABLET, FILM COATED ORAL TWICE A DAY
Qty: 60 TABLET | Status: ACTIVE | COMMUNITY

## 2022-06-13 RX ORDER — LEVOTHYROXINE SODIUM 25 UG/1
1 CAPSULE IN THE MORNING ON AN EMPTY STOMACH CAPSULE ORAL ONCE A DAY
Qty: 30 | Status: ACTIVE | COMMUNITY
Start: 2021-07-27

## 2022-06-13 RX ORDER — LOSARTAN POTASSIUM 50 MG/1
1 TABLET TABLET ORAL TWICE A DAY
Status: ACTIVE | COMMUNITY

## 2022-06-13 RX ORDER — LEVOTHYROXINE SODIUM 25 UG/1
1 TABLET IN THE MORNING ON AN EMPTY STOMACH TABLET ORAL ONCE A DAY
Qty: 30 | Status: ON HOLD | COMMUNITY

## 2022-06-13 NOTE — HPI-EARLY SATIETY
She admits continued early satiety.    Last visit (3/10/2022) Patient admits continued episodes of early satiety.   Of last note an EGD should be scheduled when she has improved clinically

## 2022-06-13 NOTE — HPI-ABDOMINAL PAIN
Patient presents today for a follow up of abdominal pain. She admits continued pain since her last visit. She denies improvement in symptoms. She reports LLQ that she describes as a pulling that will last all day.   She denies taking any medication to reduce abdominal pain. Patient denies pain is reduced after a bowel movement.     Last visit (3/10/2022)  Patient presents today for a follow up of abdominal pain. She admits improvement in abdominal pain since her last visit, but admits persistence  of intermittent discomfort.  Patient admits  making any dietary modifications since her last visit.   Patient has been taking Spironolactone 25 mg with improvement of her symptoms, but due to the side effects of her abdominal discomfort  tightness all around her abdominal, chest and upper extremities, she does not take the medication daily.   Due to the side effects of abdominal discomfort, she no longer takes Bumetanide 1 MG.  Patient admits abdominal discomfort, bloating, epigastric discomfort, SOB and early satiety.

## 2022-06-13 NOTE — HPI-ELEVATED LIVER ENZYMES
She was advised to stop drinking coconut juice she reports that she has stopped it . Her most recent labs were completed on 6/6/2022 with normal LFTs.   Patient currently denies any jaundice, dizziness, RUQ pain, easy bruising or chills.   Admits increased fatigue and  bloating  RISK FACTORS: Denies Alcohol use, drug use, travel outside the [place], NSAIDs, tattoos, piercings,  service,   personal exposure to liver diseases, prison, rehab, or family history in her mother of liver disease. Admits protein supplements - Ensure daily.  family history in her mother of liver disease     Last visit (3/10/2022) She admits  completing any labs since last visit .   Currently admits daily dizziness if anything is not in her eye level, if she has to adjust her head at all she will become dizzy. She also reports that she experiences daily fatigue.   She denies jaundice, chills or easy bruising. Patient admits RUQ discomfort and epigastric "tightness"

## 2022-06-13 NOTE — HPI-CIRRHOSIS
She denies the continued use of Spironolactone 25 mg 1 PO QD but her Nephrologist recommended her to stop due to her creatine level. She has stopped taking this medication 3 months ago. She denies any changes since stopping the medication.   She admits the continued use of Bumetanide 1 mg 1 PO QOD .   She admits a f/u with Dr. Estrada and Dr. Flanagan.    Complications of liver disease include: No Jaundice: No  Hepatic Encephalopathy : No  Ascites : Unure - per patient and daughter have not done any paracentesis.  Spontaneous Bacterial Peritonitis (ascites fluid infection): No  Variceal hemorrhage : No  Portal Vein Thrombosis : No  Muscle Mass Loss: No  Weight Loss: Yes Sleeping habits: Unable to sleep at night will sleep 2-4 hrs during the day.     Last visit (3/10/2022) Patient's daughter admits improvement in abdominal distention but continues to experience S.O.B  and upper abdominal discomfort which occurs intermittently, typically when she feels hungry.  Admits  improvement in lower extremity swelling.  Admit patient seem to be forgetful more than usual over the past month.  Patient has been taking Spironolactone 25 mg with improvement of her symptoms, but due to the side effects of her abdominal discomfort  tightness all around her abdominal, chest and upper extremities, she does not take the medication daily.   Due to the side effects of abdominal discomfort, she no longer takes Bumetanide 1 MG.  Recent lab results showing abnormal findings (03/08/2022)  BUN1 92 High Creatinine 1.78 High eGFR 29 Low  BUN/Creatinine Ratio 52 High  Sodium  129 Low Potassium 5.6 High Carbon Dioxide, Total 18 Low Protein, Total  8.7 High Globulin, Total 4.9 High A/G Ratio 0.8 Low AST (SGOT) 45 High   Complications of liver disease include: No Jaundice: No  Hepatic Encephalopathy : No  Ascites : No Spontaneous Bacterial Peritonitis (ascites fluid infection): No  Variceal hemorrhage : No  Portal Vein Thrombosis : No  Muscle Mass Loss: No  Weight Loss: Yes Sleeping habits: Unable to sleep at night will sleep 2-4 hrs during the day.

## 2022-06-13 NOTE — HPI-CONSTIPATION
Patient admits improvement in episodes of constipation since her last visit. Currently she reports 1 bowel movementa day with occasional strain. Her stools are hard and slightly bigger than janny without blood, mucus, or melena.   She denies any episodes of rectal pain or pruritus ani.     Last visit (3/10/2022) She admits improvement in episodes of constipation since her last visit. Currently she reports 1 formed bowel movements daily without strain. Her stools are formed without blood, mucus, or melena.

## 2022-07-24 LAB
INR: 0.9
PROTHROMBIN TIME: 10

## 2022-07-28 ENCOUNTER — OFFICE VISIT (OUTPATIENT)
Dept: URBAN - METROPOLITAN AREA CLINIC 33 | Facility: CLINIC | Age: 77
End: 2022-07-28

## 2022-07-28 RX ORDER — BUMETANIDE 1 MG/1
1 TABLET TABLET ORAL DAILY
OUTPATIENT

## 2022-07-28 NOTE — HPI-CONSTIPATION
Last visit (6/13/2022)  Patient admits improvement in episodes of constipation since her last visit. Currently she reports 1 bowel movementa day with occasional strain. Her stools are hard and slightly bigger than janny without blood, mucus, or melena.   She denies any episodes of rectal pain or pruritus ani.

## 2022-07-28 NOTE — HPI-ABDOMINAL PAIN
Patient presents today for a follow of abdominal pain. She admits/denies continued abdominal pain since her last visit.   She admits/denies improvement in symptoms at this time.    Last visit (6/13/2022)  Patient presents today for a follow up of abdominal pain. She admits continued pain since her last visit. She denies improvement in symptoms. She reports LLQ that she describes as a pulling that will last all day.   She denies taking any medication to reduce abdominal pain. Patient denies pain is reduced after a bowel movement.

## 2022-07-28 NOTE — HPI-ELEVATED LIVER ENZYMES
Last visit (6/13/2022)  She was advised to stop drinking coconut juice she reports that she has stopped it . Her most recent labs were completed on 6/6/2022 with normal LFTs.   Patient currently denies any jaundice, dizziness, RUQ pain, easy bruising or chills.   Admits increased fatigue and  bloating  RISK FACTORS: Denies Alcohol use, drug use, travel outside the [place], NSAIDs, tattoos, piercings,  service,   personal exposure to liver diseases, long term, rehab, or family history in her mother of liver disease. Admits protein supplements - Ensure daily.  family history in her mother of liver disease

## 2022-07-28 NOTE — HPI-CIRRHOSIS
Last visit (6/13/2022)  She denies the continued use of Spironolactone 25 mg 1 PO QD but her Nephrologist recommended her to stop due to her creatine level. She has stopped taking this medication 3 months ago. She denies any changes since stopping the medication.   She admits the continued use of Bumetanide 1 mg 1 PO QOD .   She admits a f/u with Dr. Estrada and Dr. Flanagan.    Complications of liver disease include: No Jaundice: No  Hepatic Encephalopathy : No  Ascites : Unure - per patient and daughter have not done any paracentesis.  Spontaneous Bacterial Peritonitis (ascites fluid infection): No  Variceal hemorrhage : No  Portal Vein Thrombosis : No  Muscle Mass Loss: No  Weight Loss: Yes Sleeping habits: Unable to sleep at night will sleep 2-4 hrs during the day.

## 2022-08-23 ENCOUNTER — OFFICE VISIT (OUTPATIENT)
Dept: URBAN - METROPOLITAN AREA CLINIC 31 | Facility: CLINIC | Age: 77
End: 2022-08-23

## 2022-09-22 ENCOUNTER — OFFICE VISIT (OUTPATIENT)
Dept: URBAN - METROPOLITAN AREA CLINIC 33 | Facility: CLINIC | Age: 77
End: 2022-09-22

## 2022-09-22 NOTE — HPI-ELEVATED LIVER ENZYMES
Last visit (6/13/2022)  She was advised to stop drinking coconut juice she reports that she has stopped it . Her most recent labs were completed on 6/6/2022 with normal LFTs.   Patient currently denies any jaundice, dizziness, RUQ pain, easy bruising or chills.   Admits increased fatigue and  bloating  RISK FACTORS: Denies Alcohol use, drug use, travel outside the [place], NSAIDs, tattoos, piercings,  service,   personal exposure to liver diseases, retirement, rehab, or family history in her mother of liver disease. Admits protein supplements - Ensure daily.  family history in her mother of liver disease

## 2022-09-22 NOTE — HPI-ABDOMINAL PAIN
Patient presents today for a TeleVisit follow  up of abdominal pain. She admits/denies continued abdominal pain since her last visit.   She admits/denies improvement in symptoms at this time.    Last visit (6/13/2022)  Patient presents today for a follow up of abdominal pain. She admits continued pain since her last visit. She denies improvement in symptoms. She reports LLQ that she describes as a pulling that will last all day.   She denies taking any medication to reduce abdominal pain. Patient denies pain is reduced after a bowel movement.

## 2022-12-13 ENCOUNTER — TELEPHONE ENCOUNTER (OUTPATIENT)
Dept: URBAN - METROPOLITAN AREA CLINIC 33 | Facility: CLINIC | Age: 77
End: 2022-12-13

## 2022-12-13 ENCOUNTER — OFFICE VISIT (OUTPATIENT)
Dept: URBAN - METROPOLITAN AREA CLINIC 31 | Facility: CLINIC | Age: 77
End: 2022-12-13
Payer: MEDICARE

## 2022-12-13 VITALS — WEIGHT: 100 LBS | HEIGHT: 60 IN | BODY MASS INDEX: 19.63 KG/M2

## 2022-12-13 DIAGNOSIS — K80.20 GALLSTONES: ICD-10-CM

## 2022-12-13 DIAGNOSIS — R68.81 EARLY SATIETY: ICD-10-CM

## 2022-12-13 DIAGNOSIS — E87.5 HYPERKALEMIA: ICD-10-CM

## 2022-12-13 DIAGNOSIS — R10.12 LEFT UPPER QUADRANT ABDOMINAL PAIN: ICD-10-CM

## 2022-12-13 DIAGNOSIS — R18.8 OTHER ASCITES: ICD-10-CM

## 2022-12-13 DIAGNOSIS — N28.9 RENAL INSUFFICIENCY: ICD-10-CM

## 2022-12-13 DIAGNOSIS — K74.60 UNSPECIFIED CIRRHOSIS OF LIVER: ICD-10-CM

## 2022-12-13 DIAGNOSIS — D64.89 ANEMIA DUE TO OTHER CAUSE, NOT CLASSIFIED: ICD-10-CM

## 2022-12-13 PROBLEM — 19943007: Status: ACTIVE | Noted: 2021-06-10

## 2022-12-13 PROBLEM — 389026000: Status: ACTIVE | Noted: 2021-03-25

## 2022-12-13 PROBLEM — 235919008: Status: ACTIVE | Noted: 2021-12-13

## 2022-12-13 PROCEDURE — 99214 OFFICE O/P EST MOD 30 MIN: CPT | Performed by: INTERNAL MEDICINE

## 2022-12-13 RX ORDER — BUMETANIDE 1 MG/1
1 TABLET TABLET ORAL DAILY
Status: ACTIVE | COMMUNITY

## 2022-12-13 RX ORDER — B-COMPLEX WITH VITAMIN C
AS DIRECTED TABLET ORAL ONCE A DAY
Status: ACTIVE | COMMUNITY

## 2022-12-13 RX ORDER — CYCLOSPORINE 0.5 MG/ML
1 DROP INTO AFFECTED EYE EMULSION OPHTHALMIC TWICE A DAY
Status: ACTIVE | COMMUNITY

## 2022-12-13 RX ORDER — BUMETANIDE 1 MG/1
1 TABLET TABLET ORAL DAILY
OUTPATIENT

## 2022-12-13 RX ORDER — PILOCARPINE HYDROCHLORIDE 5 MG/1
1 TABLET TABLET, FILM COATED ORAL THREE TIMES A DAY
Qty: 90 | Status: ON HOLD | COMMUNITY

## 2022-12-13 RX ORDER — LOSARTAN POTASSIUM 50 MG/1
1 TABLET TABLET ORAL TWICE A DAY
Status: ACTIVE | COMMUNITY

## 2022-12-13 RX ORDER — POTASSIUM CHLORIDE 1500 MG/1
1 TABLET WITH FOOD TABLET, FILM COATED, EXTENDED RELEASE ORAL ONCE A DAY
Qty: 30 | Status: ON HOLD | COMMUNITY

## 2022-12-13 RX ORDER — THIAMINE HCL 100 MG
TABLET ORAL
Status: ON HOLD | COMMUNITY

## 2022-12-13 RX ORDER — SPIRONOLACTONE 25 MG/1
1 TABLET TABLET, FILM COATED ORAL ONCE A DAY
Status: ON HOLD | COMMUNITY

## 2022-12-13 RX ORDER — LEVOTHYROXINE SODIUM 25 UG/1
1 TABLET IN THE MORNING ON AN EMPTY STOMACH TABLET ORAL ONCE A DAY
Qty: 30 | Status: ON HOLD | COMMUNITY

## 2022-12-13 RX ORDER — LEVOTHYROXINE SODIUM 25 UG/1
1 CAPSULE IN THE MORNING ON AN EMPTY STOMACH CAPSULE ORAL ONCE A DAY
Qty: 30 | Status: ACTIVE | COMMUNITY
Start: 2021-07-27

## 2022-12-13 RX ORDER — NITROGLYCERIN 0.4 MG/1
AS DIRECTED TABLET SUBLINGUAL
Status: ACTIVE | COMMUNITY

## 2022-12-13 RX ORDER — FUROSEMIDE 20 MG/1
1 TABLET TABLET ORAL ONCE A DAY
Qty: 30 | Status: ON HOLD | COMMUNITY
Start: 2021-04-29

## 2022-12-13 RX ORDER — DICLOFENAC SODIUM 10 MG/G
AS DIRECTED GEL TOPICAL
Status: ON HOLD | COMMUNITY

## 2022-12-13 RX ORDER — SILDENAFIL CITRATE 25 MG/1
1 TABLET AS NEEDED TABLET, FILM COATED ORAL TWICE A DAY
Qty: 60 TABLET | Status: ACTIVE | COMMUNITY

## 2022-12-13 NOTE — HPI-ELEVATED LIVER ENZYMES
She currently denies any jaundice, fatigue, dizziness, RUQ pain, bloating, easy bruising or chills.   Most recent labs were performed  on 07.29.2022 by Dr. Luong which revealed MCV: 98H, MCHC: 31.1L, Platelets: 145L, BUN: 44H, Creatinine: 1.06H, eGFR: 54L, BUN/Creatinine Ratio: 42H, Uric Acid: 9.2H, Ferritin: 223H, all other labs were normal.  RISK FACTORS: Denies Alcohol use, drug use, travel outside the [place], NSAIDs, protein supplements, tattoos, piercings,  service, blood transfusion, family history of liver disease or cancer, personal exposure to liver diseases, senior living, rehab     Last visit (6/13/2022)  She was advised to stop drinking coconut juice she reports that she has stopped it . Her most recent labs were completed on 6/6/2022 with normal LFTs.   Patient currently denies any jaundice, dizziness, RUQ pain, easy bruising or chills.   Admits increased fatigue and  bloating  RISK FACTORS: Denies Alcohol use, drug use, travel outside the [place], NSAIDs, tattoos, piercings,  service,   personal exposure to liver diseases, senior living, rehab, or family history in her mother of liver disease. Admits protein supplements - Ensure daily.  family history in her mother of liver disease

## 2022-12-13 NOTE — HPI-CIRRHOSIS
She denies any symptoms of  fatigue, easy bruising, decreased appetite, nausea, vomiting, edema, unintentional weight loss, or jaundice, or shortness of breath.   Most recent labs completed on 07.29.2022 by Dr. Luong which revealed MCV: 98H, MCHC: 31.1L, Platelets: 145L, BUN: 44H, Creatinine: 1.06H, eGFR: 54L, BUN/Creatinine Ratio: 42H, Uric Acid: 9.2H, Ferritin: 223H, all other labs were normal.   RISK FACTORS: Denies Alcohol, drugs, travel outside the [place], NSAIDs, protein supplements, tattoos, piercings,  service, blood transfusion, family history of liver disease or cancer, personal exposure to liver diseases, long term, rehab   MELD Score: 7 Complications of liver disease include: Jaundice: No Hepatic Encephalopathy (lose of Brain function due to liver not working) : No Ascites (abdominal fluid): No Spontaneous Bacterial Peritonitis (ascites fluid infection): No Variceal hemorrhage (bleeding within the veins due to high B/P): No Portal Vein Thrombosis (blockage or narrowing of the portal vein): No Muscle Mass Loss: No Weight Loss: No Sleeping habits: -- hours per night.  NAFLD Score:   Last visit (6/13/2022)  She denies the continued use of Spironolactone 25 mg 1 PO QD but her Nephrologist recommended her to stop due to her creatine level. She has stopped taking this medication 3 months ago. She denies any changes since stopping the medication.   She admits the continued use of Bumetanide 1 mg 1 PO QOD .   She admits a f/u with Dr. Estrada and Dr. Flanagan.    Complications of liver disease include: No Jaundice: No  Hepatic Encephalopathy : No  Ascites : Unure - per patient and daughter have not done any paracentesis.  Spontaneous Bacterial Peritonitis (ascites fluid infection): No  Variceal hemorrhage : No  Portal Vein Thrombosis : No  Muscle Mass Loss: No  Weight Loss: Yes Sleeping habits: Unable to sleep at night will sleep 2-4 hrs during the day.

## 2022-12-13 NOTE — HPI-ABDOMINAL PAIN
Patient presents today for a TeleVisit follow  up of abdominal pain. She denies continued abdominal pain since her last visit.   She admits improvement in symptoms at this time. Patient states her abdominal pain has resolved.   Last visit (6/13/2022)  Patient presents today for a follow up of abdominal pain. She admits continued pain since her last visit. She denies improvement in symptoms. She reports LLQ that she describes as a pulling that will last all day.   She denies taking any medication to reduce abdominal pain. Patient denies pain is reduced after a bowel movement.

## 2022-12-13 NOTE — HPI-EARLY SATIETY
She continues to admits symptoms of early satiety.  Last visit (6/13/2022)  She admits continued early satiety.

## 2022-12-13 NOTE — HPI-CONSTIPATION
Patient continues to admits improvement with her constipation symptoms. Currently, having 1 bowel movement a day. Stools are normal without the presence of blood, mucus, and melena. She denies any rectal pain/pruritus ani. Patient admits to having dark color stools due to her takng iron supplement. Patient denies taking any OTC or prescribe medications for relief of symptoms.   Last visit (6/13/2022)  Patient admits improvement in episodes of constipation since her last visit. Currently she reports 1 bowel movementa day with occasional strain. Her stools are hard and slightly bigger than janny without blood, mucus, or melena.   She denies any episodes of rectal pain or pruritus ani.

## 2023-01-14 LAB
A/G RATIO: 1
AFP, SERUM, TUMOR MARKER: 2.7
ALBUMIN: 4.3
ALKALINE PHOSPHATASE: 70
ALT (SGPT): 11
AST (SGOT): 28
BASO (ABSOLUTE): 0
BASOS: 1
BILIRUBIN, TOTAL: 0.3
BUN/CREATININE RATIO: 46
BUN: 57
CALCIUM: 9.1
CARBON DIOXIDE, TOTAL: 26
CHLORIDE: 94
CREATININE: 1.23
EGFR: 45
EOS (ABSOLUTE): 0.1
EOS: 2
GLOBULIN, TOTAL: 4.2
GLUCOSE: 95
HEMATOCRIT: 36.3
HEMATOLOGY COMMENTS:: (no result)
HEMOGLOBIN: 11.9
IMMATURE CELLS: (no result)
IMMATURE GRANS (ABS): 0
IMMATURE GRANULOCYTES: 1
INR: 1
LYMPHS (ABSOLUTE): 1.8
LYMPHS: 30
MCH: 31.6
MCHC: 32.8
MCV: 97
MONOCYTES(ABSOLUTE): 0.7
MONOCYTES: 12
NEUTROPHILS (ABSOLUTE): 3.2
NEUTROPHILS: 54
NRBC: (no result)
PLATELETS: 188
POTASSIUM: 3.9
PROTEIN, TOTAL: 8.5
PROTHROMBIN TIME: 10.1
RBC: 3.76
RDW: 13.4
SODIUM: 136
WBC: 5.9

## 2023-03-28 NOTE — PHYSICAL EXAM EYES:
Conjuntivae and eyelids appear normal, Sclerae : White without injection Topical Steroids Applications Pregnancy And Lactation Text: Most topical steroids are considered safe to use during pregnancy and lactation.  Any topical steroid applied to the breast or nipple should be washed off before breastfeeding.

## 2023-04-25 ENCOUNTER — LAB OUTSIDE AN ENCOUNTER (OUTPATIENT)
Dept: URBAN - METROPOLITAN AREA CLINIC 31 | Facility: CLINIC | Age: 78
End: 2023-04-25

## 2023-05-01 ENCOUNTER — LAB OUTSIDE AN ENCOUNTER (OUTPATIENT)
Dept: URBAN - METROPOLITAN AREA CLINIC 31 | Facility: CLINIC | Age: 78
End: 2023-05-01

## 2023-07-20 ENCOUNTER — OFFICE VISIT (OUTPATIENT)
Dept: URBAN - METROPOLITAN AREA CLINIC 33 | Facility: CLINIC | Age: 78
End: 2023-07-20

## 2023-08-17 ENCOUNTER — OFFICE VISIT (OUTPATIENT)
Dept: URBAN - METROPOLITAN AREA CLINIC 33 | Facility: CLINIC | Age: 78
End: 2023-08-17

## 2023-10-04 LAB
A/G RATIO: 1.1
AFP, SERUM, TUMOR MARKER: 2.5
ALBUMIN: 4
ALKALINE PHOSPHATASE: 81
ALT (SGPT): 14
AST (SGOT): 29
BASO (ABSOLUTE): 0
BASOS: 1
BILIRUBIN, TOTAL: 0.5
BUN/CREATININE RATIO: 43
BUN: 40
CALCIUM: 9.2
CARBON DIOXIDE, TOTAL: 22
CHLORIDE: 101
CREATININE: 0.92
EGFR: 64
EOS (ABSOLUTE): 0.1
EOS: 1
GLOBULIN, TOTAL: 3.8
GLUCOSE: 97
HEMATOCRIT: 34
HEMATOLOGY COMMENTS:: (no result)
HEMOGLOBIN: 10.8
IMMATURE CELLS: (no result)
IMMATURE GRANS (ABS): 0
IMMATURE GRANULOCYTES: 0
INR: 1
LYMPHS (ABSOLUTE): 1.4
LYMPHS: 34
MCH: 30.9
MCHC: 31.8
MCV: 97
MONOCYTES(ABSOLUTE): 0.5
MONOCYTES: 12
NEUTROPHILS (ABSOLUTE): 2.2
NEUTROPHILS: 52
NRBC: (no result)
PLATELETS: 166
POTASSIUM: 4.2
PROTEIN, TOTAL: 7.8
PROTHROMBIN TIME: 10.8
RBC: 3.5
RDW: 14.7
SODIUM: 137
WBC: 4.2

## 2023-10-12 ENCOUNTER — OFFICE VISIT (OUTPATIENT)
Dept: URBAN - METROPOLITAN AREA CLINIC 33 | Facility: CLINIC | Age: 78
End: 2023-10-12
Payer: MEDICARE

## 2023-10-12 VITALS
HEIGHT: 60 IN | WEIGHT: 106.6 LBS | SYSTOLIC BLOOD PRESSURE: 138 MMHG | HEART RATE: 60 BPM | BODY MASS INDEX: 20.93 KG/M2 | OXYGEN SATURATION: 96 % | DIASTOLIC BLOOD PRESSURE: 80 MMHG

## 2023-10-12 DIAGNOSIS — K80.20 GALLSTONES: ICD-10-CM

## 2023-10-12 DIAGNOSIS — K74.60 UNSPECIFIED CIRRHOSIS OF LIVER: ICD-10-CM

## 2023-10-12 DIAGNOSIS — D64.89 ANEMIA DUE TO OTHER CAUSE, NOT CLASSIFIED: ICD-10-CM

## 2023-10-12 DIAGNOSIS — R18.8 OTHER ASCITES: ICD-10-CM

## 2023-10-12 DIAGNOSIS — R68.81 EARLY SATIETY: ICD-10-CM

## 2023-10-12 DIAGNOSIS — R10.12 LEFT UPPER QUADRANT ABDOMINAL PAIN: ICD-10-CM

## 2023-10-12 DIAGNOSIS — E87.5 HYPERKALEMIA: ICD-10-CM

## 2023-10-12 DIAGNOSIS — N28.9 RENAL INSUFFICIENCY: ICD-10-CM

## 2023-10-12 PROCEDURE — 99214 OFFICE O/P EST MOD 30 MIN: CPT | Performed by: INTERNAL MEDICINE

## 2023-10-12 RX ORDER — NITROGLYCERIN 0.4 MG/1
AS DIRECTED TABLET SUBLINGUAL
Status: ACTIVE | COMMUNITY

## 2023-10-12 RX ORDER — PILOCARPINE HYDROCHLORIDE 5 MG/1
1 TABLET TABLET, FILM COATED ORAL THREE TIMES A DAY
Qty: 90 | Status: ON HOLD | COMMUNITY

## 2023-10-12 RX ORDER — SILDENAFIL CITRATE 25 MG/1
1 TABLET AS NEEDED TABLET, FILM COATED ORAL TWICE A DAY
Qty: 60 TABLET | Status: ACTIVE | COMMUNITY

## 2023-10-12 RX ORDER — LEVOTHYROXINE SODIUM 25 UG/1
1 TABLET IN THE MORNING ON AN EMPTY STOMACH TABLET ORAL ONCE A DAY
Qty: 30 | Status: ON HOLD | COMMUNITY

## 2023-10-12 RX ORDER — BUMETANIDE 1 MG/1
1 TABLET TABLET ORAL DAILY
OUTPATIENT

## 2023-10-12 RX ORDER — CYCLOSPORINE 0.5 MG/ML
1 DROP INTO AFFECTED EYE EMULSION OPHTHALMIC TWICE A DAY
Status: ACTIVE | COMMUNITY

## 2023-10-12 RX ORDER — BUMETANIDE 0.5 MG/1
1 TABLET TABLET ORAL ONCE A DAY
Status: ACTIVE | COMMUNITY

## 2023-10-12 RX ORDER — POTASSIUM CHLORIDE 1500 MG/1
1 TABLET WITH FOOD TABLET, FILM COATED, EXTENDED RELEASE ORAL ONCE A DAY
Qty: 30 | Status: ON HOLD | COMMUNITY

## 2023-10-12 RX ORDER — THIAMINE HCL 100 MG
TABLET ORAL
Status: ON HOLD | COMMUNITY

## 2023-10-12 RX ORDER — DICLOFENAC SODIUM 10 MG/G
AS DIRECTED GEL TOPICAL
Status: ON HOLD | COMMUNITY

## 2023-10-12 RX ORDER — LOSARTAN POTASSIUM 50 MG/1
1 TABLET TABLET ORAL TWICE A DAY
Status: ACTIVE | COMMUNITY

## 2023-10-12 RX ORDER — SPIRONOLACTONE 25 MG/1
1 TABLET TABLET, FILM COATED ORAL ONCE A DAY
Status: ON HOLD | COMMUNITY

## 2023-10-12 RX ORDER — LEVOTHYROXINE SODIUM 25 UG/1
1 CAPSULE IN THE MORNING ON AN EMPTY STOMACH CAPSULE ORAL ONCE A DAY
Qty: 30 | Status: ACTIVE | COMMUNITY
Start: 2021-07-27

## 2023-10-12 RX ORDER — B-COMPLEX WITH VITAMIN C
AS DIRECTED TABLET ORAL ONCE A DAY
Status: ON HOLD | COMMUNITY

## 2023-10-12 RX ORDER — FUROSEMIDE 20 MG/1
1 TABLET TABLET ORAL ONCE A DAY
Qty: 30 | Status: ON HOLD | COMMUNITY
Start: 2021-04-29

## 2023-10-12 NOTE — HPI-EARLY SATIETY
Patient denies any recent early satiety.  Last visit (12/13/2022) She continues to admit symptoms of early satiety.  Last visit (6/13/2022) She admits continued early satiety.

## 2023-10-12 NOTE — HPI-ELEVATED LIVER ENZYMES
Patient currently denies any jaundice, fatigue, dizziness, RUQ pain, easy bruising or chills. Admits  bloating.  Last visit (12/13/2022) She currently denies any jaundice, fatigue, dizziness, RUQ pain, bloating, easy bruising or chills.   Most recent labs were performed  on 07.29.2022 by Dr. Luong which revealed MCV: 98H, MCHC: 31.1L, Platelets: 145L, BUN: 44H, Creatinine: 1.06H, eGFR: 54L, BUN/Creatinine Ratio: 42H, Uric Acid: 9.2H, Ferritin: 223H, all other labs were normal.  RISK FACTORS: Denies Alcohol use, drug use, travel outside the [place], NSAIDs, protein supplements, tattoos, piercings,  service, blood transfusion, family history of liver disease or cancer, personal exposure to liver diseases, correction, rehab     Last visit (6/13/2022) She was advised to stop drinking coconut juice she reports that she has stopped it . Her most recent labs were completed on 6/6/2022 with normal LFTs.   Patient currently denies any jaundice, dizziness, RUQ pain, easy bruising or chills.   Admits increased fatigue and  bloating  RISK FACTORS: Denies Alcohol use, drug use, travel outside the [place], NSAIDs, tattoos, piercings,  service,   personal exposure to liver diseases, correction, rehab, or family history in her mother of liver disease. Admits protein supplements - Ensure daily.  family history in her mother of liver disease

## 2023-10-12 NOTE — HPI-ABDOMINAL PAIN
Patient denies current abdominal pain.  Last visit (12/13/2022) Patient presents today for a TeleVisit follow  up of abdominal pain. She denies continued abdominal pain since her last visit.   She admits improvement in symptoms at this time. Patient states her abdominal pain has resolved.   Last visit (6/13/2022) Patient presents today for a follow up of abdominal pain. She admits continued pain since her last visit. She denies improvement in symptoms. She reports LLQ that she describes as a pulling that will last all day.   She denies taking any medication to reduce abdominal pain. Patient denies pain is reduced after a bowel movement.

## 2023-10-12 NOTE — HPI-CONSTIPATION
Patient denies improvement with her constipation symptoms. Currently, having 1-2 bowel movement per day, without strain. Stools are firm without the presence of blood, mucus, and melena. She denies any rectal pain/pruritus ani.   Last visit (12/13/2022) Patient continues to admits improvement with her constipation symptoms. Currently, having 1 bowel movement a day. Stools are normal without the presence of blood, mucus, and melena. She denies any rectal pain/pruritus ani. Patient admits to having dark color stools due to her takng iron supplement. Patient denies taking any OTC or prescribe medications for relief of symptoms.   Last visit (6/13/2022)  Patient admits improvement in episodes of constipation since her last visit. Currently she reports 1 bowel movementa day with occasional strain. Her stools are hard and slightly bigger than janny without blood, mucus, or melena.   She denies any episodes of rectal pain or pruritus ani.

## 2023-10-12 NOTE — HPI-CIRRHOSIS
Patient presents today for a follow up of cirrhosis.She admits any associated symptoms of  fatigue, easy bruising, decreased appetite, nausea, vomiting, edema, shortness of breath, unintentional weight loss, or jaundice.    Most recent labs were completed 10.03.2023 which reveale RBC: 3.50L, Hgb: 10.8L, BUN: 40H, BUN/Creatinine: 43H, A/G Ratio: 1.1L all other labs were normal.   MELD Score: 6 Complications of liver disease include: Jaundice: N Hepatic Encephalopathy (lose of Brain function due to liver not working) :  N Ascites (abdominal fluid):  N Spontaneous Bacterial Peritonitis (ascites fluid infection):  N Variceal hemorrhage (bleeding within the veins due to high B/P):  N Portal Vein Thrombosis (blockage or narrowing of the portal vein):  N Muscle Mass Loss:  N Weight Loss:  N Sleeping habits: 5 hours per night.    Last visit (12/13/2022) She denies any symptoms of  fatigue, easy bruising, decreased appetite, nausea, vomiting, edema, unintentional weight loss, or jaundice, or shortness of breath.   Most recent labs completed on 07.29.2022 by Dr. Luong which revealed MCV: 98H, MCHC: 31.1L, Platelets: 145L, BUN: 44H, Creatinine: 1.06H, eGFR: 54L, BUN/Creatinine Ratio: 42H, Uric Acid: 9.2H, Ferritin: 223H, all other labs were normal.   RISK FACTORS: Denies Alcohol, drugs, travel outside the [place], NSAIDs, protein supplements, tattoos, piercings,  service, blood transfusion, family history of liver disease or cancer, personal exposure to liver diseases, [facility], rehab   MELD Score: 7 Complications of liver disease include: Jaundice: No Hepatic Encephalopathy (lose of Brain function due to liver not working) : No Ascites (abdominal fluid): No Spontaneous Bacterial Peritonitis (ascites fluid infection): No Variceal hemorrhage (bleeding within the veins due to high B/P): No Portal Vein Thrombosis (blockage or narrowing of the portal vein): No Muscle Mass Loss: No Weight Loss: No Sleeping habits: -- hours per night.  NAFLD Score:   Last visit (6/13/2022)  She denies the continued use of Spironolactone 25 mg 1 PO QD but her Nephrologist recommended her to stop due to her creatine level. She has stopped taking this medication 3 months ago. She denies any changes since stopping the medication.   She admits the continued use of Bumetanide 1 mg 1 PO QOD .   She admits a f/u with Dr. Estrada and Dr. Flanagan.    Complications of liver disease include: No Jaundice: No  Hepatic Encephalopathy : No  Ascites : Unure - per patient and daughter have not done any paracentesis.  Spontaneous Bacterial Peritonitis (ascites fluid infection): No  Variceal hemorrhage : No  Portal Vein Thrombosis : No  Muscle Mass Loss: No  Weight Loss: Yes Sleeping habits: Unable to sleep at night will sleep 2-4 hrs during the day.

## 2024-03-04 ENCOUNTER — OV EP (OUTPATIENT)
Dept: URBAN - METROPOLITAN AREA CLINIC 33 | Facility: CLINIC | Age: 79
End: 2024-03-04

## 2024-03-04 RX ORDER — CYCLOSPORINE 0.5 MG/ML
1 DROP INTO AFFECTED EYE EMULSION OPHTHALMIC TWICE A DAY
COMMUNITY

## 2024-03-04 RX ORDER — BUMETANIDE 1 MG/1
1 TABLET TABLET ORAL DAILY
OUTPATIENT

## 2024-03-04 RX ORDER — PILOCARPINE HYDROCHLORIDE 5 MG/1
1 TABLET TABLET, FILM COATED ORAL THREE TIMES A DAY
Qty: 90 | Status: ON HOLD | COMMUNITY

## 2024-03-04 RX ORDER — LOSARTAN POTASSIUM 50 MG/1
1 TABLET TABLET ORAL TWICE A DAY
COMMUNITY

## 2024-03-04 RX ORDER — B-COMPLEX WITH VITAMIN C
AS DIRECTED TABLET ORAL ONCE A DAY
Status: ON HOLD | COMMUNITY

## 2024-03-04 RX ORDER — LEVOTHYROXINE SODIUM 25 UG/1
1 CAPSULE IN THE MORNING ON AN EMPTY STOMACH CAPSULE ORAL ONCE A DAY
Qty: 30 | COMMUNITY
Start: 2021-07-27

## 2024-03-04 RX ORDER — SPIRONOLACTONE 25 MG/1
1 TABLET TABLET, FILM COATED ORAL ONCE A DAY
Status: ON HOLD | COMMUNITY

## 2024-03-04 RX ORDER — SILDENAFIL CITRATE 25 MG/1
1 TABLET AS NEEDED TABLET, FILM COATED ORAL TWICE A DAY
Qty: 60 TABLET | COMMUNITY

## 2024-03-04 RX ORDER — THIAMINE HCL 100 MG
TABLET ORAL
Status: ON HOLD | COMMUNITY

## 2024-03-04 RX ORDER — NITROGLYCERIN 0.4 MG/1
AS DIRECTED TABLET SUBLINGUAL
COMMUNITY

## 2024-03-04 RX ORDER — POTASSIUM CHLORIDE 1500 MG/1
1 TABLET WITH FOOD TABLET, FILM COATED, EXTENDED RELEASE ORAL ONCE A DAY
Qty: 30 | Status: ON HOLD | COMMUNITY

## 2024-03-04 RX ORDER — LEVOTHYROXINE SODIUM 25 UG/1
1 TABLET IN THE MORNING ON AN EMPTY STOMACH TABLET ORAL ONCE A DAY
Qty: 30 | Status: ON HOLD | COMMUNITY

## 2024-03-04 RX ORDER — FUROSEMIDE 20 MG/1
1 TABLET TABLET ORAL ONCE A DAY
Qty: 30 | Status: ON HOLD | COMMUNITY
Start: 2021-04-29

## 2024-03-04 RX ORDER — BUMETANIDE 1 MG/1
1 TABLET TABLET ORAL DAILY
COMMUNITY

## 2024-03-04 RX ORDER — DICLOFENAC SODIUM 10 MG/G
AS DIRECTED GEL TOPICAL
Status: ON HOLD | COMMUNITY

## 2024-03-04 NOTE — HPI-ELEVATED LIVER ENZYMES
She admits/denies any symptoms of jaundice, fatigue, dizziness, RUQ pain, bloating, easy bruising or chills.   Most recent labs were performed 02.23.2024 revealed Cholesterol, Total: 207H, HDL-Cholesterol: 49L, LDL-Cholesterol: 137H, Non HDL Cholesterol: 158H, Urea Nitrgoen (BUN): 40H, Creatinine: 1.20H, Bun/Creatinine Ratio: 33H, Protein,Total: 9.1H, Globulin: 4.9H, Albumin/Globulin Ratio: 0.9L, Hgb A1c: 6.3H, TSH: 6.32H, RBC: 3.73L, Hgb: 11.4L, all other labs were normal.   (Last Visit 10.12.2023) Patient currently denies any jaundice, fatigue, dizziness, RUQ pain, easy bruising or chills. Admits  bloating.  Last visit (12/13/2022)  She currently denies any jaundice, fatigue, dizziness, RUQ pain, bloating, easy bruising or chills.   Most recent labs were performed  on 07.29.2022 by Dr. Luong which revealed MCV: 98H, MCHC: 31.1L, Platelets: 145L, BUN: 44H, Creatinine: 1.06H, eGFR: 54L, BUN/Creatinine Ratio: 42H, Uric Acid: 9.2H, Ferritin: 223H, all other labs were normal.  RISK FACTORS: Denies Alcohol use, drug use, travel outside the [place], NSAIDs, protein supplements, tattoos, piercings,  service, blood transfusion, family history of liver disease or cancer, personal exposure to liver diseases, penitentiary, rehab     Last visit (6/13/2022) She was advised to stop drinking coconut juice she reports that she has stopped it . Her most recent labs were completed on 6/6/2022 with normal LFTs.   Patient currently denies any jaundice, dizziness, RUQ pain, easy bruising or chills.   Admits increased fatigue and  bloating  RISK FACTORS: Denies Alcohol use, drug use, travel outside the [place], NSAIDs, tattoos, piercings,  service,   personal exposure to liver diseases, penitentiary, rehab, or family history in her mother of liver disease. Admits protein supplements - Ensure daily.  family history in her mother of liver disease

## 2024-03-04 NOTE — HPI-EARLY SATIETY
She continues to admits/denies any episodes of early satiety at this time.   (Last Visit 10.12.2023) Patient denies any recent early satiety.  Last visit (12/13/2022)  She continues to admit symptoms of early satiety.  Last visit (6/13/2022)  She admits continued early satiety.

## 2024-03-04 NOTE — HPI-ABDOMINAL PAIN
She continues to admits/denies any recent episodes of abdominal pain at this time.   (Last Visit [id number]) Patient denies current abdominal pain.  Last visit (12/13/2022)  Patient presents today for a TeleVisit follow  up of abdominal pain. She denies continued abdominal pain since her last visit.   She admits improvement in symptoms at this time. Patient states her abdominal pain has resolved.   Last visit (6/13/2022) Patient presents today for a follow up of abdominal pain. She admits continued pain since her last visit. She denies improvement in symptoms. She reports LLQ that she describes as a pulling that will last all day.   She denies taking any medication to reduce abdominal pain. Patient denies pain is reduced after a bowel movement.

## 2024-03-04 NOTE — HPI-CIRRHOSIS
Patient present today for a 6 month follow-up of cirrhosis. She admits/denies continued use of Buetanide 1mg with/out improve symptoms. Patient admits/denies any symptoms of fatigue, shortness of breath, easy bruising, decreased appetite, nausea, vomiting, edema, unintentional weight loss, or jaundice.   Most recent labs were completed 02.23.2024 revealed Cholesterol, Total: 207H, HDL-Cholesterol: 49L, LDL-Cholesterol: 137H, Non HDL Cholesterol: 158H, Urea Nitrgoen (BUN): 40H, Creatinine: 1.20H, Bun/Creatinine Ratio: 33H, Protein,Total: 9.1H, Globulin: 4.9H, Albumin/Globulin Ratio: 0.9L, Hgb A1c: 6.3H, TSH: 6.32H, RBC: 3.73L, Hgb: 11.4L, all other labs were normal.   MELD Score:  Complications of liver disease include: Yes/No Jaundice: Yes/No Hepatic Encephalopathy (lose of Brain function due to liver not working) : Yes/No  Ascites (abdominal fluid): Yes/No  Spontaneous Bacterial Peritonitis (ascites fluid infection): Yes/No  Variceal hemorrhage (bleeding within the veins due to high B/P): Yes/No  Portal Vein Thrombosis (blockage or narrowing of the portal vein): Yes/No  Muscle Mass Loss: Yes/No  Weight Loss: Yes/No  Sleeping habits: -- hours per night.    (Last Visit [id number]) Patient presents today for a follow up of cirrhosis.She admits any associated symptoms of  fatigue, easy bruising, decreased appetite, nausea, vomiting, edema, shortness of breath, unintentional weight loss, or jaundice.    Most recent labs were completed 10.03.2023 which reveale RBC: 3.50L, Hgb: 10.8L, BUN: 40H, BUN/Creatinine: 43H, A/G Ratio: 1.1L all other labs were normal.   MELD Score: 6 Complications of liver disease include: Jaundice: N Hepatic Encephalopathy (lose of Brain function due to liver not working) :  N Ascites (abdominal fluid):  N Spontaneous Bacterial Peritonitis (ascites fluid infection):  N Variceal hemorrhage (bleeding within the veins due to high B/P):  N Portal Vein Thrombosis (blockage or narrowing of the portal vein):  N Muscle Mass Loss:  N Weight Loss:  N Sleeping habits: 5 hours per night.    Last visit (12/13/2022) She denies any symptoms of  fatigue, easy bruising, decreased appetite, nausea, vomiting, edema, unintentional weight loss, or jaundice, or shortness of breath.   Most recent labs completed on 07.29.2022 by Dr. Luong which revealed MCV: 98H, MCHC: 31.1L, Platelets: 145L, BUN: 44H, Creatinine: 1.06H, eGFR: 54L, BUN/Creatinine Ratio: 42H, Uric Acid: 9.2H, Ferritin: 223H, all other labs were normal.   RISK FACTORS: Denies Alcohol, drugs, travel outside the [place], NSAIDs, protein supplements, tattoos, piercings,  service, blood transfusion, family history of liver disease or cancer, personal exposure to liver diseases, nursing home, rehab   MELD Score: 7 Complications of liver disease include: Jaundice: No Hepatic Encephalopathy (lose of Brain function due to liver not working) : No Ascites (abdominal fluid): No Spontaneous Bacterial Peritonitis (ascites fluid infection): No Variceal hemorrhage (bleeding within the veins due to high B/P): No Portal Vein Thrombosis (blockage or narrowing of the portal vein): No Muscle Mass Loss: No Weight Loss: No Sleeping habits: -- hours per night.  NAFLD Score:   Last visit (6/13/2022)  She denies the continued use of Spironolactone 25 mg 1 PO QD but her Nephrologist recommended her to stop due to her creatine level. She has stopped taking this medication 3 months ago. She denies any changes since stopping the medication.   She admits the continued use of Bumetanide 1 mg 1 PO QOD .   She admits a f/u with Dr. Estrada and Dr. Flanagan.    Complications of liver disease include: No Jaundice: No  Hepatic Encephalopathy : No  Ascites : Unure - per patient and daughter have not done any paracentesis.  Spontaneous Bacterial Peritonitis (ascites fluid infection): No  Variceal hemorrhage : No  Portal Vein Thrombosis : No  Muscle Mass Loss: No  Weight Loss: Yes Sleeping habits: Unable to sleep at night will sleep 2-4 hrs during the day.

## 2024-03-04 NOTE — HPI-CONSTIPATION
She admits/denies any improvement with her constipation symptoms. Patient admits _ bowel movement per day, with/out strain. Stools are _ with/out the presence of blood, mucus, and melena. When patient has a bowel movement she does/not feel like she is completely emptying her bowels. She admits/denies any pruritus ani or rectal pain.    (Last Visit 10.12.2023) Patient denies improvement with her constipation symptoms. Currently, having 1-2 bowel movement per day, without strain. Stools are firm without the presence of blood, mucus, and melena. She denies any rectal pain/pruritus ani.   Last visit (12/13/2022)  Patient continues to admits improvement with her constipation symptoms. Currently, having 1 bowel movement a day. Stools are normal without the presence of blood, mucus, and melena. She denies any rectal pain/pruritus ani. Patient admits to having dark color stools due to her takng iron supplement. Patient denies taking any OTC or prescribe medications for relief of symptoms.   Last visit (6/13/2022)  Patient admits improvement in episodes of constipation since her last visit. Currently she reports 1 bowel movementa day with occasional strain. Her stools are hard and slightly bigger than janny without blood, mucus, or melena.   She denies any episodes of rectal pain or pruritus ani.

## 2024-04-08 ENCOUNTER — LAB (OUTPATIENT)
Dept: URBAN - METROPOLITAN AREA CLINIC 33 | Facility: CLINIC | Age: 79
End: 2024-04-08